# Patient Record
Sex: FEMALE | NOT HISPANIC OR LATINO | URBAN - METROPOLITAN AREA
[De-identification: names, ages, dates, MRNs, and addresses within clinical notes are randomized per-mention and may not be internally consistent; named-entity substitution may affect disease eponyms.]

---

## 2018-01-03 ENCOUNTER — APPOINTMENT (OUTPATIENT)
Dept: RADIOLOGY | Facility: MEDICAL CENTER | Age: 78
DRG: 329 | End: 2018-01-03
Payer: MEDICARE

## 2018-01-03 ENCOUNTER — HOSPITAL ENCOUNTER (INPATIENT)
Facility: MEDICAL CENTER | Age: 78
LOS: 4 days | DRG: 329 | End: 2018-01-07
Attending: EMERGENCY MEDICINE | Admitting: INTERNAL MEDICINE
Payer: MEDICARE

## 2018-01-03 ENCOUNTER — RESOLUTE PROFESSIONAL BILLING HOSPITAL PROF FEE (OUTPATIENT)
Dept: HOSPITALIST | Facility: MEDICAL CENTER | Age: 78
End: 2018-01-03
Payer: MEDICARE

## 2018-01-03 ENCOUNTER — APPOINTMENT (OUTPATIENT)
Dept: RADIOLOGY | Facility: MEDICAL CENTER | Age: 78
DRG: 329 | End: 2018-01-03
Attending: EMERGENCY MEDICINE
Payer: MEDICARE

## 2018-01-03 DIAGNOSIS — K56.2 CECAL VOLVULUS (HCC): ICD-10-CM

## 2018-01-03 DIAGNOSIS — J10.1 INFLUENZA A: ICD-10-CM

## 2018-01-03 LAB
ABO GROUP BLD: NORMAL
ABO GROUP BLD: NORMAL
ALBUMIN SERPL BCP-MCNC: 4.4 G/DL (ref 3.2–4.9)
ALBUMIN/GLOB SERPL: 1.6 G/DL
ALP SERPL-CCNC: 59 U/L (ref 30–99)
ALT SERPL-CCNC: 18 U/L (ref 2–50)
ANION GAP SERPL CALC-SCNC: 13 MMOL/L (ref 0–11.9)
APPEARANCE UR: ABNORMAL
APTT PPP: 23.1 SEC (ref 24.7–36)
AST SERPL-CCNC: 30 U/L (ref 12–45)
BACTERIA #/AREA URNS HPF: ABNORMAL /HPF
BASOPHILS # BLD AUTO: 0.2 % (ref 0–1.8)
BASOPHILS # BLD: 0.02 K/UL (ref 0–0.12)
BILIRUB SERPL-MCNC: 0.9 MG/DL (ref 0.1–1.5)
BILIRUB UR QL STRIP.AUTO: NEGATIVE
BLD GP AB SCN SERPL QL: NORMAL
BNP SERPL-MCNC: 671 PG/ML (ref 0–100)
BUN SERPL-MCNC: 16 MG/DL (ref 8–22)
CALCIUM SERPL-MCNC: 8.8 MG/DL (ref 8.5–10.5)
CEA SERPL-MCNC: 7.9 NG/ML (ref 0–3)
CHLORIDE SERPL-SCNC: 94 MMOL/L (ref 96–112)
CO2 SERPL-SCNC: 23 MMOL/L (ref 20–33)
COLOR UR: YELLOW
CREAT SERPL-MCNC: 0.62 MG/DL (ref 0.5–1.4)
EOSINOPHIL # BLD AUTO: 0 K/UL (ref 0–0.51)
EOSINOPHIL NFR BLD: 0 % (ref 0–6.9)
EPI CELLS #/AREA URNS HPF: ABNORMAL /HPF
ERYTHROCYTE [DISTWIDTH] IN BLOOD BY AUTOMATED COUNT: 40.4 FL (ref 35.9–50)
FLUAV RNA SPEC QL NAA+PROBE: POSITIVE
FLUBV RNA SPEC QL NAA+PROBE: NEGATIVE
GFR SERPL CREATININE-BSD FRML MDRD: >60 ML/MIN/1.73 M 2
GLOBULIN SER CALC-MCNC: 2.8 G/DL (ref 1.9–3.5)
GLUCOSE SERPL-MCNC: 193 MG/DL (ref 65–99)
GLUCOSE UR STRIP.AUTO-MCNC: 250 MG/DL
HCT VFR BLD AUTO: 43.6 % (ref 37–47)
HGB BLD-MCNC: 15.8 G/DL (ref 12–16)
HYALINE CASTS #/AREA URNS LPF: ABNORMAL /LPF
IMM GRANULOCYTES # BLD AUTO: 0.04 K/UL (ref 0–0.11)
IMM GRANULOCYTES NFR BLD AUTO: 0.4 % (ref 0–0.9)
INR PPP: 1.08 (ref 0.87–1.13)
KETONES UR STRIP.AUTO-MCNC: 15 MG/DL
LACTATE BLD-SCNC: 2.2 MMOL/L (ref 0.5–2)
LEUKOCYTE ESTERASE UR QL STRIP.AUTO: ABNORMAL
LIPASE SERPL-CCNC: 4 U/L (ref 11–82)
LYMPHOCYTES # BLD AUTO: 1.15 K/UL (ref 1–4.8)
LYMPHOCYTES NFR BLD: 10.9 % (ref 22–41)
MCH RBC QN AUTO: 33.1 PG (ref 27–33)
MCHC RBC AUTO-ENTMCNC: 36.2 G/DL (ref 33.6–35)
MCV RBC AUTO: 91.4 FL (ref 81.4–97.8)
MICRO URNS: ABNORMAL
MONOCYTES # BLD AUTO: 0.55 K/UL (ref 0–0.85)
MONOCYTES NFR BLD AUTO: 5.2 % (ref 0–13.4)
NEUTROPHILS # BLD AUTO: 8.79 K/UL (ref 2–7.15)
NEUTROPHILS NFR BLD: 83.3 % (ref 44–72)
NITRITE UR QL STRIP.AUTO: NEGATIVE
NRBC # BLD AUTO: 0 K/UL
NRBC BLD-RTO: 0 /100 WBC
PH UR STRIP.AUTO: 5.5 [PH]
PLATELET # BLD AUTO: 208 K/UL (ref 164–446)
PMV BLD AUTO: 9.1 FL (ref 9–12.9)
POTASSIUM SERPL-SCNC: 3.4 MMOL/L (ref 3.6–5.5)
PROT SERPL-MCNC: 7.2 G/DL (ref 6–8.2)
PROT UR QL STRIP: 100 MG/DL
PROTHROMBIN TIME: 13.7 SEC (ref 12–14.6)
RBC # BLD AUTO: 4.77 M/UL (ref 4.2–5.4)
RBC # URNS HPF: ABNORMAL /HPF
RBC UR QL AUTO: ABNORMAL
RH BLD: NORMAL
SODIUM SERPL-SCNC: 130 MMOL/L (ref 135–145)
SP GR UR STRIP.AUTO: 1.02
UROBILINOGEN UR STRIP.AUTO-MCNC: 0.2 MG/DL
WBC # BLD AUTO: 10.6 K/UL (ref 4.8–10.8)
WBC #/AREA URNS HPF: ABNORMAL /HPF

## 2018-01-03 PROCEDURE — 700117 HCHG RX CONTRAST REV CODE 255: Performed by: EMERGENCY MEDICINE

## 2018-01-03 PROCEDURE — 87502 INFLUENZA DNA AMP PROBE: CPT

## 2018-01-03 PROCEDURE — 160029 HCHG SURGERY MINUTES - 1ST 30 MINS LEVEL 4: Performed by: SURGERY

## 2018-01-03 PROCEDURE — 74177 CT ABD & PELVIS W/CONTRAST: CPT

## 2018-01-03 PROCEDURE — 96361 HYDRATE IV INFUSION ADD-ON: CPT

## 2018-01-03 PROCEDURE — 0DTF0ZZ RESECTION OF RIGHT LARGE INTESTINE, OPEN APPROACH: ICD-10-PCS | Performed by: SURGERY

## 2018-01-03 PROCEDURE — 85025 COMPLETE CBC W/AUTO DIFF WBC: CPT

## 2018-01-03 PROCEDURE — 80053 COMPREHEN METABOLIC PANEL: CPT

## 2018-01-03 PROCEDURE — 160002 HCHG RECOVERY MINUTES (STAT): Performed by: SURGERY

## 2018-01-03 PROCEDURE — 71045 X-RAY EXAM CHEST 1 VIEW: CPT

## 2018-01-03 PROCEDURE — 501433 HCHG STAPLER, GIA MULTIFIRE 60/80: Performed by: SURGERY

## 2018-01-03 PROCEDURE — 85610 PROTHROMBIN TIME: CPT

## 2018-01-03 PROCEDURE — 99291 CRITICAL CARE FIRST HOUR: CPT

## 2018-01-03 PROCEDURE — 85730 THROMBOPLASTIN TIME PARTIAL: CPT

## 2018-01-03 PROCEDURE — 86901 BLOOD TYPING SEROLOGIC RH(D): CPT

## 2018-01-03 PROCEDURE — 96374 THER/PROPH/DIAG INJ IV PUSH: CPT

## 2018-01-03 PROCEDURE — 160009 HCHG ANES TIME/MIN: Performed by: SURGERY

## 2018-01-03 PROCEDURE — 96375 TX/PRO/DX INJ NEW DRUG ADDON: CPT

## 2018-01-03 PROCEDURE — 86900 BLOOD TYPING SEROLOGIC ABO: CPT

## 2018-01-03 PROCEDURE — 160041 HCHG SURGERY MINUTES - EA ADDL 1 MIN LEVEL 4: Performed by: SURGERY

## 2018-01-03 PROCEDURE — 502704 HCHG DEVICE, LIGASURE IMPACT: Performed by: SURGERY

## 2018-01-03 PROCEDURE — 500122 HCHG BOVIE, BLADE: Performed by: SURGERY

## 2018-01-03 PROCEDURE — 81001 URINALYSIS AUTO W/SCOPE: CPT

## 2018-01-03 PROCEDURE — 501461: Performed by: SURGERY

## 2018-01-03 PROCEDURE — 86850 RBC ANTIBODY SCREEN: CPT

## 2018-01-03 PROCEDURE — 501838 HCHG SUTURE GENERAL: Performed by: SURGERY

## 2018-01-03 PROCEDURE — 83880 ASSAY OF NATRIURETIC PEPTIDE: CPT

## 2018-01-03 PROCEDURE — 160035 HCHG PACU - 1ST 60 MINS PHASE I: Performed by: SURGERY

## 2018-01-03 PROCEDURE — 83690 ASSAY OF LIPASE: CPT

## 2018-01-03 PROCEDURE — 501452 HCHG STAPLES, GIA MULTIFIRE 60/80: Performed by: SURGERY

## 2018-01-03 PROCEDURE — 36415 COLL VENOUS BLD VENIPUNCTURE: CPT

## 2018-01-03 PROCEDURE — 501435 HCHG STAPLER, LINEAR 60: Performed by: SURGERY

## 2018-01-03 PROCEDURE — C1765 ADHESION BARRIER: HCPCS | Performed by: SURGERY

## 2018-01-03 PROCEDURE — 700111 HCHG RX REV CODE 636 W/ 250 OVERRIDE (IP): Performed by: EMERGENCY MEDICINE

## 2018-01-03 PROCEDURE — 93005 ELECTROCARDIOGRAM TRACING: CPT | Performed by: EMERGENCY MEDICINE

## 2018-01-03 PROCEDURE — 700105 HCHG RX REV CODE 258: Performed by: EMERGENCY MEDICINE

## 2018-01-03 PROCEDURE — 770021 HCHG ROOM/CARE - ISO PRIVATE

## 2018-01-03 PROCEDURE — 87040 BLOOD CULTURE FOR BACTERIA: CPT

## 2018-01-03 PROCEDURE — 500424 HCHG DRESSING, AIRSTRIP: Performed by: SURGERY

## 2018-01-03 PROCEDURE — 501445 HCHG STAPLER, SKIN DISP: Performed by: SURGERY

## 2018-01-03 PROCEDURE — 160036 HCHG PACU - EA ADDL 30 MINS PHASE I: Performed by: SURGERY

## 2018-01-03 PROCEDURE — 88307 TISSUE EXAM BY PATHOLOGIST: CPT

## 2018-01-03 PROCEDURE — 82378 CARCINOEMBRYONIC ANTIGEN: CPT

## 2018-01-03 PROCEDURE — 700111 HCHG RX REV CODE 636 W/ 250 OVERRIDE (IP)

## 2018-01-03 PROCEDURE — 83605 ASSAY OF LACTIC ACID: CPT

## 2018-01-03 PROCEDURE — 700101 HCHG RX REV CODE 250

## 2018-01-03 PROCEDURE — A4314 CATH W/DRAINAGE 2-WAY LATEX: HCPCS | Performed by: SURGERY

## 2018-01-03 PROCEDURE — 160022 HCHG BLOCK: Performed by: SURGERY

## 2018-01-03 PROCEDURE — 160048 HCHG OR STATISTICAL LEVEL 1-5: Performed by: SURGERY

## 2018-01-03 RX ORDER — SODIUM CHLORIDE 9 MG/ML
1000 INJECTION, SOLUTION INTRAVENOUS ONCE
Status: COMPLETED | OUTPATIENT
Start: 2018-01-03 | End: 2018-01-03

## 2018-01-03 RX ORDER — BISACODYL 10 MG
10 SUPPOSITORY, RECTAL RECTAL
Status: DISCONTINUED | OUTPATIENT
Start: 2018-01-03 | End: 2018-01-07 | Stop reason: HOSPADM

## 2018-01-03 RX ORDER — POLYETHYLENE GLYCOL 3350 17 G/17G
1 POWDER, FOR SOLUTION ORAL
Status: DISCONTINUED | OUTPATIENT
Start: 2018-01-03 | End: 2018-01-07 | Stop reason: HOSPADM

## 2018-01-03 RX ORDER — AMOXICILLIN 250 MG
2 CAPSULE ORAL 2 TIMES DAILY
Status: DISCONTINUED | OUTPATIENT
Start: 2018-01-04 | End: 2018-01-07 | Stop reason: HOSPADM

## 2018-01-03 RX ORDER — MAGNESIUM HYDROXIDE 1200 MG/15ML
LIQUID ORAL
Status: DISCONTINUED | OUTPATIENT
Start: 2018-01-03 | End: 2018-01-03 | Stop reason: HOSPADM

## 2018-01-03 RX ORDER — ONDANSETRON 2 MG/ML
4 INJECTION INTRAMUSCULAR; INTRAVENOUS EVERY 4 HOURS PRN
Status: DISCONTINUED | OUTPATIENT
Start: 2018-01-03 | End: 2018-01-07 | Stop reason: HOSPADM

## 2018-01-03 RX ORDER — SODIUM CHLORIDE 9 MG/ML
INJECTION, SOLUTION INTRAVENOUS CONTINUOUS
Status: DISPENSED | OUTPATIENT
Start: 2018-01-03 | End: 2018-01-04

## 2018-01-03 RX ORDER — OSELTAMIVIR PHOSPHATE 75 MG/1
75 CAPSULE ORAL EVERY 12 HOURS
Status: DISCONTINUED | OUTPATIENT
Start: 2018-01-03 | End: 2018-01-07 | Stop reason: HOSPADM

## 2018-01-03 RX ORDER — ONDANSETRON 4 MG/1
4 TABLET, ORALLY DISINTEGRATING ORAL EVERY 4 HOURS PRN
Status: DISCONTINUED | OUTPATIENT
Start: 2018-01-03 | End: 2018-01-07 | Stop reason: HOSPADM

## 2018-01-03 RX ORDER — MORPHINE SULFATE 4 MG/ML
4 INJECTION, SOLUTION INTRAMUSCULAR; INTRAVENOUS ONCE
Status: COMPLETED | OUTPATIENT
Start: 2018-01-03 | End: 2018-01-03

## 2018-01-03 RX ORDER — MIDAZOLAM HYDROCHLORIDE 1 MG/ML
INJECTION INTRAMUSCULAR; INTRAVENOUS
Status: DISPENSED
Start: 2018-01-03 | End: 2018-01-04

## 2018-01-03 RX ORDER — MORPHINE SULFATE 4 MG/ML
1 INJECTION, SOLUTION INTRAMUSCULAR; INTRAVENOUS EVERY 4 HOURS PRN
Status: DISCONTINUED | OUTPATIENT
Start: 2018-01-03 | End: 2018-01-07 | Stop reason: HOSPADM

## 2018-01-03 RX ORDER — ONDANSETRON 2 MG/ML
4 INJECTION INTRAMUSCULAR; INTRAVENOUS ONCE
Status: COMPLETED | OUTPATIENT
Start: 2018-01-03 | End: 2018-01-03

## 2018-01-03 RX ADMIN — EPHEDRINE SULFATE 50 MG: 50 INJECTION INTRAMUSCULAR; INTRAVENOUS; SUBCUTANEOUS at 21:49

## 2018-01-03 RX ADMIN — IOHEXOL 100 ML: 350 INJECTION, SOLUTION INTRAVENOUS at 18:57

## 2018-01-03 RX ADMIN — ONDANSETRON 4 MG: 2 INJECTION INTRAMUSCULAR; INTRAVENOUS at 16:42

## 2018-01-03 RX ADMIN — SODIUM CHLORIDE 1000 ML: 9 INJECTION, SOLUTION INTRAVENOUS at 16:42

## 2018-01-03 RX ADMIN — MORPHINE SULFATE 4 MG: 4 INJECTION INTRAVENOUS at 17:33

## 2018-01-03 RX ADMIN — SODIUM CHLORIDE 1000 ML: 9 INJECTION, SOLUTION INTRAVENOUS at 17:33

## 2018-01-03 ASSESSMENT — PAIN SCALES - GENERAL
PAINLEVEL_OUTOF10: 0
PAINLEVEL_OUTOF10: 2
PAINLEVEL_OUTOF10: 0
PAINLEVEL_OUTOF10: 10

## 2018-01-03 ASSESSMENT — PATIENT HEALTH QUESTIONNAIRE - PHQ9
4. FEELING TIRED OR HAVING LITTLE ENERGY: NOT AT ALL
8. MOVING OR SPEAKING SO SLOWLY THAT OTHER PEOPLE COULD HAVE NOTICED. OR THE OPPOSITE, BEING SO FIGETY OR RESTLESS THAT YOU HAVE BEEN MOVING AROUND A LOT MORE THAN USUAL: NOT AT ALL
6. FEELING BAD ABOUT YOURSELF - OR THAT YOU ARE A FAILURE OR HAVE LET YOURSELF OR YOUR FAMILY DOWN: NOT AL ALL
9. THOUGHTS THAT YOU WOULD BE BETTER OFF DEAD, OR OF HURTING YOURSELF: SEVERAL DAYS
5. POOR APPETITE OR OVEREATING: NOT AT ALL
1. LITTLE INTEREST OR PLEASURE IN DOING THINGS: SEVERAL DAYS
3. TROUBLE FALLING OR STAYING ASLEEP OR SLEEPING TOO MUCH: NOT AT ALL
2. FEELING DOWN, DEPRESSED, IRRITABLE, OR HOPELESS: NOT AT ALL
7. TROUBLE CONCENTRATING ON THINGS, SUCH AS READING THE NEWSPAPER OR WATCHING TELEVISION: NOT AT ALL
SUM OF ALL RESPONSES TO PHQ QUESTIONS 1-9: 2
SUM OF ALL RESPONSES TO PHQ9 QUESTIONS 1 AND 2: 1

## 2018-01-03 ASSESSMENT — LIFESTYLE VARIABLES
ON A TYPICAL DAY WHEN YOU DRINK ALCOHOL HOW MANY DRINKS DO YOU HAVE: 2
ALCOHOL_USE: YES
EVER_SMOKED: YES
EVER HAD A DRINK FIRST THING IN THE MORNING TO STEADY YOUR NERVES TO GET RID OF A HANGOVER: NO
TOTAL SCORE: 0
TOTAL SCORE: 0
CONSUMPTION TOTAL: NEGATIVE
AVERAGE NUMBER OF DAYS PER WEEK YOU HAVE A DRINK CONTAINING ALCOHOL: 0
TOTAL SCORE: 0
HAVE YOU EVER FELT YOU SHOULD CUT DOWN ON YOUR DRINKING: NO
HAVE PEOPLE ANNOYED YOU BY CRITICIZING YOUR DRINKING: NO
HOW MANY TIMES IN THE PAST YEAR HAVE YOU HAD 5 OR MORE DRINKS IN A DAY: 0
EVER FELT BAD OR GUILTY ABOUT YOUR DRINKING: NO

## 2018-01-03 ASSESSMENT — COPD QUESTIONNAIRES
DURING THE PAST 4 WEEKS HOW MUCH DID YOU FEEL SHORT OF BREATH: NONE/LITTLE OF THE TIME
COPD SCREENING SCORE: 2
HAVE YOU SMOKED AT LEAST 100 CIGARETTES IN YOUR ENTIRE LIFE: NO/DON'T KNOW
DO YOU EVER COUGH UP ANY MUCUS OR PHLEGM?: NO/ONLY WITH OCCASIONAL COLDS OR INFECTIONS

## 2018-01-03 NOTE — ED NOTES
Blood specimen collected per ER protocol. Pt educated on clean catch urine sample and specimen cup provided. PT to senior waiting area.

## 2018-01-03 NOTE — ED NOTES
".  Chief Complaint   Patient presents with   • Flu Like Symptoms     N/V/D x 1 week, unable to tolerate PO   • Abdominal Pain     x 1 week   • Shortness of Breath     Speaking in full sentences, NAD     ./64   Pulse 94   Temp 37.3 °C (99.1 °F)   Resp 16   Ht 1.549 m (5' 1\")   Wt 61.6 kg (135 lb 12.9 oz)   SpO2 92%   BMI 25.66 kg/m²     Ambulatory to triage with above complaint, given mask, educated on triage process, placed in lobby with family member, told to inform staff of any changes in condition.    "

## 2018-01-04 PROBLEM — E87.20 LACTIC ACIDOSIS: Status: ACTIVE | Noted: 2018-01-04

## 2018-01-04 PROBLEM — E87.6 HYPOKALEMIA: Status: ACTIVE | Noted: 2018-01-04

## 2018-01-04 PROBLEM — J10.1 INFLUENZA A: Status: ACTIVE | Noted: 2018-01-04

## 2018-01-04 PROBLEM — E87.1 HYPONATREMIA: Status: ACTIVE | Noted: 2018-01-04

## 2018-01-04 PROCEDURE — 90670 PCV13 VACCINE IM: CPT | Performed by: SURGERY

## 2018-01-04 PROCEDURE — G8980 MOBILITY D/C STATUS: HCPCS | Mod: CH

## 2018-01-04 PROCEDURE — A9270 NON-COVERED ITEM OR SERVICE: HCPCS | Performed by: INTERNAL MEDICINE

## 2018-01-04 PROCEDURE — 90471 IMMUNIZATION ADMIN: CPT

## 2018-01-04 PROCEDURE — 97161 PT EVAL LOW COMPLEX 20 MIN: CPT

## 2018-01-04 PROCEDURE — 97165 OT EVAL LOW COMPLEX 30 MIN: CPT

## 2018-01-04 PROCEDURE — G8987 SELF CARE CURRENT STATUS: HCPCS | Mod: CI

## 2018-01-04 PROCEDURE — 770021 HCHG ROOM/CARE - ISO PRIVATE

## 2018-01-04 PROCEDURE — G8979 MOBILITY GOAL STATUS: HCPCS | Mod: CH

## 2018-01-04 PROCEDURE — 94760 N-INVAS EAR/PLS OXIMETRY 1: CPT

## 2018-01-04 PROCEDURE — 3E0234Z INTRODUCTION OF SERUM, TOXOID AND VACCINE INTO MUSCLE, PERCUTANEOUS APPROACH: ICD-10-PCS | Performed by: SURGERY

## 2018-01-04 PROCEDURE — 99407 BEHAV CHNG SMOKING > 10 MIN: CPT | Performed by: INTERNAL MEDICINE

## 2018-01-04 PROCEDURE — A9270 NON-COVERED ITEM OR SERVICE: HCPCS | Performed by: SURGERY

## 2018-01-04 PROCEDURE — 99223 1ST HOSP IP/OBS HIGH 75: CPT | Mod: 25 | Performed by: INTERNAL MEDICINE

## 2018-01-04 PROCEDURE — 700102 HCHG RX REV CODE 250 W/ 637 OVERRIDE(OP): Performed by: SURGERY

## 2018-01-04 PROCEDURE — G8988 SELF CARE GOAL STATUS: HCPCS | Mod: CI

## 2018-01-04 PROCEDURE — 700105 HCHG RX REV CODE 258: Performed by: INTERNAL MEDICINE

## 2018-01-04 PROCEDURE — G8978 MOBILITY CURRENT STATUS: HCPCS | Mod: CH

## 2018-01-04 PROCEDURE — 700111 HCHG RX REV CODE 636 W/ 250 OVERRIDE (IP): Performed by: SURGERY

## 2018-01-04 PROCEDURE — 700102 HCHG RX REV CODE 250 W/ 637 OVERRIDE(OP): Performed by: INTERNAL MEDICINE

## 2018-01-04 PROCEDURE — G8989 SELF CARE D/C STATUS: HCPCS | Mod: CI

## 2018-01-04 RX ORDER — HYDROCODONE BITARTRATE AND ACETAMINOPHEN 5; 325 MG/1; MG/1
1-2 TABLET ORAL EVERY 6 HOURS PRN
Qty: 20 TAB | Refills: 0 | Status: SHIPPED | OUTPATIENT
Start: 2018-01-04 | End: 2018-01-07

## 2018-01-04 RX ORDER — HYDROCODONE BITARTRATE AND ACETAMINOPHEN 5; 325 MG/1; MG/1
1-2 TABLET ORAL EVERY 6 HOURS PRN
Status: DISCONTINUED | OUTPATIENT
Start: 2018-01-04 | End: 2018-01-07 | Stop reason: HOSPADM

## 2018-01-04 RX ADMIN — OSELTAMIVIR PHOSPHATE 75 MG: 75 CAPSULE ORAL at 20:07

## 2018-01-04 RX ADMIN — OSELTAMIVIR PHOSPHATE 75 MG: 75 CAPSULE ORAL at 08:10

## 2018-01-04 RX ADMIN — STANDARDIZED SENNA CONCENTRATE AND DOCUSATE SODIUM 2 TABLET: 8.6; 5 TABLET, FILM COATED ORAL at 20:07

## 2018-01-04 RX ADMIN — HYDROCODONE BITARTRATE AND ACETAMINOPHEN 1 TABLET: 5; 325 TABLET ORAL at 08:10

## 2018-01-04 RX ADMIN — SODIUM CHLORIDE: 9 INJECTION, SOLUTION INTRAVENOUS at 00:48

## 2018-01-04 RX ADMIN — ENOXAPARIN SODIUM 40 MG: 100 INJECTION SUBCUTANEOUS at 08:10

## 2018-01-04 RX ADMIN — STANDARDIZED SENNA CONCENTRATE AND DOCUSATE SODIUM 2 TABLET: 8.6; 5 TABLET, FILM COATED ORAL at 08:10

## 2018-01-04 RX ADMIN — PNEUMOCOCCAL 13-VALENT CONJUGATE VACCINE 0.5 ML: 2.2; 2.2; 2.2; 2.2; 2.2; 4.4; 2.2; 2.2; 2.2; 2.2; 2.2; 2.2; 2.2 INJECTION, SUSPENSION INTRAMUSCULAR at 01:27

## 2018-01-04 RX ADMIN — SODIUM CHLORIDE: 9 INJECTION, SOLUTION INTRAVENOUS at 10:57

## 2018-01-04 RX ADMIN — HYDROCODONE BITARTRATE AND ACETAMINOPHEN 1 TABLET: 5; 325 TABLET ORAL at 14:46

## 2018-01-04 RX ADMIN — OSELTAMIVIR PHOSPHATE 75 MG: 75 CAPSULE ORAL at 00:47

## 2018-01-04 RX ADMIN — HYDROCODONE BITARTRATE AND ACETAMINOPHEN 1 TABLET: 5; 325 TABLET ORAL at 20:07

## 2018-01-04 ASSESSMENT — ENCOUNTER SYMPTOMS
WHEEZING: 0
NAUSEA: 1
CHILLS: 1
BLOOD IN STOOL: 0
SPUTUM PRODUCTION: 0
COUGH: 1
CHILLS: 0
PALPITATIONS: 0
HEMOPTYSIS: 0
MYALGIAS: 0
VOMITING: 1
HEARTBURN: 0
BLOOD IN STOOL: 1
SHORTNESS OF BREATH: 1
WEAKNESS: 1
LOSS OF CONSCIOUSNESS: 0
BLURRED VISION: 0
NECK PAIN: 0
EYE PAIN: 0
NERVOUS/ANXIOUS: 0
CONSTIPATION: 0
DIARRHEA: 1
COUGH: 0
SEIZURES: 0
TREMORS: 0
INSOMNIA: 0
FEVER: 0
DEPRESSION: 0
HEADACHES: 0
DIZZINESS: 0
WEIGHT LOSS: 0
ABDOMINAL PAIN: 1
EYE REDNESS: 0
DIARRHEA: 0
FALLS: 0
FOCAL WEAKNESS: 0

## 2018-01-04 ASSESSMENT — PAIN SCALES - GENERAL
PAINLEVEL_OUTOF10: 2
PAINLEVEL_OUTOF10: 5
PAINLEVEL_OUTOF10: 2
PAINLEVEL_OUTOF10: 4
PAINLEVEL_OUTOF10: 2
PAINLEVEL_OUTOF10: 5
PAINLEVEL_OUTOF10: 2

## 2018-01-04 ASSESSMENT — COGNITIVE AND FUNCTIONAL STATUS - GENERAL
DRESSING REGULAR LOWER BODY CLOTHING: A LITTLE
DAILY ACTIVITIY SCORE: 21
HELP NEEDED FOR BATHING: A LITTLE
SUGGESTED CMS G CODE MODIFIER MOBILITY: CH
TOILETING: A LITTLE
SUGGESTED CMS G CODE MODIFIER DAILY ACTIVITY: CJ
MOBILITY SCORE: 24

## 2018-01-04 ASSESSMENT — GAIT ASSESSMENTS
GAIT LEVEL OF ASSIST: SUPERVISED
DISTANCE (FEET): 600

## 2018-01-04 ASSESSMENT — ACTIVITIES OF DAILY LIVING (ADL): TOILETING: INDEPENDENT

## 2018-01-04 ASSESSMENT — LIFESTYLE VARIABLES: EVER_SMOKED: YES

## 2018-01-04 NOTE — CARE PLAN
Problem: Safety  Goal: Will remain free from falls  Outcome: PROGRESSING AS EXPECTED  Encouraged to call for assistance. Pt calls appropriately.     Problem: Venous Thromboembolism (VTW)/Deep Vein Thrombosis (DVT) Prevention:  Goal: Patient will participate in Venous Thrombosis (VTE)/Deep Vein Thrombosis (DVT)Prevention Measures  Outcome: PROGRESSING AS EXPECTED  SCD's in place, lovenox started today    Problem: Bowel/Gastric:  Goal: Normal bowel function is maintained or improved  Outcome: PROGRESSING AS EXPECTED  -flatus, -BM, hypoactive bowel sounds in upper quadrants. Denies n/v, tolerating clears.     Problem: Pain Management  Goal: Pain level will decrease to patient's comfort goal  Outcome: PROGRESSING AS EXPECTED  Well controlled with PRN medications

## 2018-01-04 NOTE — FACE TO FACE
Face to Face Supporting Documentation - Home Health    The encounter with this patient was in whole or in part the primary reason for home health admission.    Date of encounter:   Patient:                    MRN:                       YOB: 2016  Lyric Belle  [id number]  1940     Home health to see patient for:  Skilled Nursing care for assessment, interventions & education  OT PT  Skilled need for:  Surgical Aftercare ceca;l volvulus surgery    Skilled nursing interventions to include:  Wound Care    Homebound status evidenced by:  Needs the assistance of another person in order to leave the home. Leaving home requires a considerable and taxing effort. There is a normal inability to leave the home.    Community Physician to provide follow up care: Stone Kelly M.D.     Optional Interventions? No      I certify the face to face encounter for this home health care referral meets the CMS requirements and the encounter/clinical assessment with the patient was, in whole, or in part, for the medical condition(s) listed above, which is the primary reason for home health care. Based on my clinical findings: the service(s) are medically necessary, support the need for home health care, and the homebound criteria are met.  I certify that this patient has had a face to face encounter by myself.  Joes Ly M.D. - NPI: [id number]

## 2018-01-04 NOTE — THERAPY
"Physical Therapy Evaluation completed.   Bed Mobility:  Supine to Sit: Supervised  Transfers: Sit to Stand: Supervised  Gait: Level Of Assist: Supervised with No Equipment Needed       Plan of Care: Patient with no further skilled PT needs in the acute care setting at this time  Discharge Recommendations: Equipment: No Equipment Needed.       See \"Rehab Therapy-Acute\" Patient Summary Report for complete documentation.     "

## 2018-01-04 NOTE — THERAPY
"Occupational Therapy Evaluation completed.   Functional Status:  Pt is a 78 y/o female admitted for cecal volvulus, now s/p ex lap and hemicolectomy. Pt initially a bit unsteady due to pain upon standing but as session progressed she was ambulating to/from the bathroom with supervision and no AD. Pt able to complete toileting, toilet transfer, grooming, lower body dressing, and donning abdominal binder with supervision and cues for compensatory strategies to prevent abdominal pain. Pt educated on home safety as well as appropriate DME for home. Pt denies any further deficits in ADLs at this time and will have good support from her family for IADLs. Recc pt continue to ambulate with nursing as much as tolerated to maintain strength and endurance.  Plan of Care: Patient with no further skilled OT needs in the acute care setting at this time  Discharge Recommendations:  Equipment: Shower Chair. Post-acute therapy Discharge to home with outpatient or home health for additional skilled therapy services.    See \"Rehab Therapy-Acute\" Patient Summary Report for complete documentation.    "

## 2018-01-04 NOTE — PROGRESS NOTES
"Surgical Progress Note:    POD# 1  S/P right hemicolectomy      No acute overnight events. Has abdominal \"soreness\". Tolerating clears.    PE:  /62   Pulse 87   Temp 36.9 °C (98.4 °F)   Resp 17   Ht 1.549 m (5' 1\")   Wt 61.6 kg (135 lb 12.9 oz)   SpO2 94%   Breastfeeding? No   BMI 25.66 kg/m²     I/O:   Intake/Output Summary (Last 24 hours) at 01/04/18 0648  Last data filed at 01/03/18 2300   Gross per 24 hour   Intake             1100 ml   Output              350 ml   Net              750 ml     UOP: 250  PO: 100  IV: 1000    NAD  Abdomen soft, distended, dressing dry    A/P: POD# 1  S/P right hemicolectomy    - OOB and ambulate, IS  - start lovenox for DVT prophylaxis  - abdominal binder  - await return of bowel function  - pt at increased risk of PNA given surgery + influenza - will monitor closely. CXR yesterday clear.     Leslye Martin M.D.  [facility]  [phone]    "

## 2018-01-04 NOTE — ED PROVIDER NOTES
ED Provider Note    Scribed for Sona Mendes M.D. by George Jalloh. 1/3/2018, 4:15 PM.    Primary care provider: Stone Kelly M.D.  Means of arrival: Walk-in  History obtained from: Patient  History limited by: None    CHIEF COMPLAINT  Chief Complaint   Patient presents with   • Flu Like Symptoms     N/V/D x 1 week, unable to tolerate PO   • Abdominal Pain     x 1 week       HPI  Lyric Belle is a 77 y.o. female who presents to the Emergency Department complaining of a cough onset 1 week ago. The patient's symptoms began as a cough and sneezing, but when she woke up the next morning she said she felt extremely ill. Her cough and sneezing then mildly resolved 4 days ago before returning the following day with additional symptoms of nausea, vomiting, diarrhea, loss of appetite, and back and upper abdominal pain, both worse on her right side. She has been unable to tolerate any food or liquid PO and denies any episodes of diarrhea today.The pain is in her right side radiates to her back. She has had associated intermittent fevers but is unclear if this is related to her flulike symptoms. It is a constant pain.  She confirms a history of appendectomy and denies cholecystectomy or other previous health conditions.    REVIEW OF SYSTEMS  Pertinent positives include cough, nausea, vomiting, sneezing, back pain, abdominal pain. Pertinent negatives include diarrhea today.  All other systems reviewed and negative. C.    PAST MEDICAL HISTORY   Patient does not have any pertinent past medical history.    SURGICAL HISTORY  Appendectomy    SOCIAL HISTORY  Patient presented with her daughter-in-law    FAMILY HISTORY  No pertinent family history.    CURRENT MEDICATIONS  Patient does not take any current medications.    ALLERGIES  Allergies   Allergen Reactions   • Erythromycin    • Flagyl [Metronidazole]    • Pcn [Penicillins]    • Sulfa Drugs        PHYSICAL EXAM  VITAL SIGNS: /99   Pulse 92   Temp 36.9 °C  "(98.5 °F)   Resp 16   Ht 1.549 m (5' 1\")   Wt 61.6 kg (135 lb 12.9 oz)   SpO2 93%   BMI 25.66 kg/m²   Constitutional: Alert in no apparent distress.  HENT: No signs of trauma, Bilateral external ears normal, Nose normal. Dry mucous membranes.   Eyes: Pupils are equal and reactive, Conjunctiva normal, Non-icteric.   Neck: Normal range of motion, No tenderness, Supple, No stridor.   Cardiovascular: Tachycardic, no murmurs.   Thorax & Lungs: Normal breath sounds, Lungs clear, No respiratory distress, No wheezing, No chest tenderness.   Abdomen: Bowel sounds normal, Soft, No masses, No peritoneal signs. Mild diffuse tenderness, no Gomez's sign tenderness  Skin: Warm, Dry, No erythema, No rash.   Musculoskeletal:  No major deformities noted.  Neurologic: Alert, moving all extremities without difficulty, no focal deficits.    LABS  Labs Reviewed   BTYPE NATRIURETIC PEPTIDE - Abnormal; Notable for the following:        Result Value    B Natriuretic Peptide 671 (*)     All other components within normal limits   CBC WITH DIFFERENTIAL - Abnormal; Notable for the following:     MCH 33.1 (*)     MCHC 36.2 (*)     Neutrophils-Polys 83.30 (*)     Lymphocytes 10.90 (*)     Neutrophils (Absolute) 8.79 (*)     All other components within normal limits   COMP METABOLIC PANEL - Abnormal; Notable for the following:     Sodium 130 (*)     Potassium 3.4 (*)     Chloride 94 (*)     Anion Gap 13.0 (*)     Glucose 193 (*)     All other components within normal limits   LIPASE - Abnormal; Notable for the following:     Lipase 4 (*)     All other components within normal limits   URINALYSIS - Abnormal; Notable for the following:     Character Cloudy (*)     Glucose 250 (*)     Ketones 15 (*)     Protein 100 (*)     Leukocyte Esterase Trace (*)     Occult Blood Moderate (*)     All other components within normal limits   LACTIC ACID - Abnormal; Notable for the following:     Lactic Acid 2.2 (*)     All other components within normal " "limits   INFLUENZA A/B BY PCR - Abnormal; Notable for the following:     Influenza virus A RNA POSITIVE (*)     All other components within normal limits   URINE MICROSCOPIC (W/UA) - Abnormal; Notable for the following:     WBC 5-10 (*)     RBC 20-50 (*)     Bacteria Moderate (*)     Epithelial Cells Many (*)     Hyaline Cast 3-5 (*)     All other components within normal limits   BLOOD CULTURE    Narrative:     Per Hospital Policy: Only change Specimen Src: to \"Line\" if  specified by physician order.   BLOOD CULTURE    Narrative:     Per Hospital Policy: Only change Specimen Src: to \"Line\" if  specified by physician order.   ESTIMATED GFR   COD (ADULT)   PROTHROMBIN TIME   APTT     All labs reviewed by me.    RADIOLOGY  CT-ABDOMEN-PELVIS WITH   Final Result      1.  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      2.  Diverticulosis without evidence of diverticulitis.      3.  Cholelithiasis.      This was discussed with Dr. Sona Mendes at 7:05 PM.      DX-CHEST-LIMITED (1 VIEW)   Final Result         No acute cardiopulmonary abnormalities are identified.        The radiologist's interpretation of all radiological studies have been reviewed by me.    EKG  12 Lead EKG interpreted by me to show:  Normal sinus rhythm  Rate 89  Axis: Normal  Intervals: Normal  Q waves in lead 3  Inverted T waves in lead 3 no other T-wave abnormalities  Normal ST segments  My impression of this EKG: Nonspecific T-wave inversions without any evidence of acute ischemia    COURSE & MEDICAL DECISION MAKING  Pertinent Labs & Imaging studies reviewed. (See chart for details)    Differential diagnoses include but are not limited to: dehydration, influenza, pneumonia    4:15 PM - Patient seen and examined at bedside. Informed the patient that labs and imaging would be conducted to assess her symptoms. Patient will be treated with Zofran. Ordered DX chest, influenza, lactic acid, eGFR, BNP, blood culture, CBC, CMP, " lipase, and urinalysis to evaluate. NS bolus administered for tachycardia, lack of PO fluids, and presumed dehydration    5:27 PM Patient will be treated with Morphine 4 mg IV for continuing abdominal pain and an additional NS bolus for dehydration and persistent nausea.    7:14pm I spoke with Dr. Martin on-call for general surgery who will take the patient to the operating room.    Decision Making:  This is a 77 y.o. year old female who presents with Flulike symptoms and right-sided abdominal pain. She was Flu A positive. She did show some signs of dehydration with an elevated anion gap. She is given IV fluids for this. She had a normal white blood cell count with a slight left shift. Lipase negative. Given her persistent abdominal pain CT scan was performed and she did appear to have a cecal volvulus. I did speak with general surgery regarding this to take her to the operating room tonight. She'll be admitted to the medicine service otherwise. I spoke with Dr. Ferro who will admit the patient.    Patient will be admitted to the medicine service with surgery consult in guarded condition.    FINAL IMPRESSION  1. Cecal volvulus (CMS-HCC)    2. Influenza A             This dictation has been created using voice recognition software and/or scribes. The accuracy of the dictation is limited by the abilities of the software and the expertise of the scribes. I expect there may be some errors of grammar and possibly content. I made every attempt to manually correct the errors within my dictation. However, errors related to voice recognition software and/or scribes may still exist and should be interpreted within the appropriate context.     I, George Jalloh (Scribe), am scribing for, and in the presence of, Sona Mendes M.D..    Electronically signed by: George Jalloh (Scribe), 1/3/2018    ISona M.D. personally performed the services described in this documentation, as scribed by George Jalloh in my  presence, and it is both accurate and complete.    The note accurately reflects work and decisions made by me.  Sona Mendes  1/3/2018  7:22 PM

## 2018-01-04 NOTE — ASSESSMENT & PLAN NOTE
-No evidence of sepsis likely secondary to bowel wall edema and necrosis  -Status post surgery as outlined above and continue IV fluid resuscitation  Only mild elevation  OK for discharge.

## 2018-01-04 NOTE — CARE PLAN
Problem: Safety  Goal: Will remain free from injury  Outcome: PROGRESSING AS EXPECTED  Bed locked and in lowest position. Call light within reach. Patient educated to use call light if assistance is needed from nursing staff. Patient verbalizes understanding.     Problem: Venous Thromboembolism (VTW)/Deep Vein Thrombosis (DVT) Prevention:  Goal: Patient will participate in Venous Thrombosis (VTE)/Deep Vein Thrombosis (DVT)Prevention Measures  Outcome: PROGRESSING AS EXPECTED  SCDs on

## 2018-01-04 NOTE — ASSESSMENT & PLAN NOTE
-Secondary to hypovolemia and dehydration continue IV fluids and trend sodium levels  Ordered labs  Resolved.

## 2018-01-04 NOTE — ASSESSMENT & PLAN NOTE
Flu Like Symptoms (N/V/D x 1 week, unable to tolerate PO); Abdominal Pain (x 1 week); and Shortness of Breath (Speaking in full sentences, NAD)  Was found to have confirmed on CT scan with dead cecum, underwent right-sided hemicolectomy with anastomosis which she tolerated the procedure well on 1/3 by Dr. Martin  Once she passes gas, diet to be advanced to clears  PT/OT ordered.   HHC ordered  At discharge date she tolerated regular diet and has bowel movement. She walked with PT. Dr. Germain cleared her for discharge. She wants to go home with home health care for dressing changes and help with wound care; she did say she can have family help her as well. SW to arrange that.  Follow up with Stone Kelly M.D. In 1-2 weeks  Follow up with Dr. Germain, in 1 week for postop visit

## 2018-01-04 NOTE — PROGRESS NOTES
Pt A&Ox4, sitting up in bed. C/o 4/10 abdominal pain and describes as soreness, medicated per MAR. Saturating >90% on 1L NC at this time. Pt on droplet precautions for influenza A. Pt has productive cough, lung sounds clear and expiratory wheeze in bases. Encouraged deep breathing and coughing. IS at bedside. Midline incision to abdomen with island dressing, old drainage present. Abdominal binder placed. Pt tolerating clear liquid diet with no n/v, hypoactive bowel sounds present. -flatus, -BM. Safety education provided. Bed locked in low. Call light within reach. Pt call appropriately for assistance. Ambulates SBA. Hourly rounding in place. All needs met at this time.

## 2018-01-04 NOTE — ASSESSMENT & PLAN NOTE
-Replace orally and check BMP in the morning  Ordered labs.  Mildly low, should improve as her diet now advanced to regular.

## 2018-01-04 NOTE — PROGRESS NOTES
Patient arrived to [place] [id number] via [facility] with transport. Patient ambulated from [facility] to bed, steady gait with initial dizziness.   Droplet precautions in place.  Patient is AOx4  Patient came up on 2 L of O2, was able to wean down to 1 L saturating at 95%.  Patient denies pain at this time.  Midline abdominal incision covered with island dressing.  Admit partially complete, awaiting Prevnar vaccine to arrive to floor.     Oriented to room call light and smoking policy.  Reviewed plan of care (equipment, incentive spirometer, sequential compression devices, medications, activity, diet, fall precautions, skin care, and pain) with patient.    2 RN skin check complete. No breakdown at bony prominences.

## 2018-01-04 NOTE — H&P
HOSPITAL MEDICINE HISTORY/ PHYSICAL    Date of Service:  1/4/2018   1:30 AM       Patient ID:   Name: Lyric Belle. YOB: 1940. Age: 77 y.o. female. MRN: [id number]    Admitting Attending:  Finn Velez     PCP : Stone Kelly M.D.          Chief Complaint:       Abdominal pain with nausea vomiting diarrhea for 1 week    History of Present Illness:    Yoshi is a 77 y.o. female w/h/o an attic and past medical problems who presents with above chief complaint. Patient states that for the last week she's had generalized symptoms including nausea vomiting diarrhea. She's been taking ibuprofen as well as vitamin C with minimal reduction in symptoms. Over the same time. She's also developed right lower quadrant abdominal pain that is nonradiating but describes horrible pain and she never really find a comfortable position. He is able to pass stool with some mild blood in it but is able to keep her weight stable. She's never had belly pain like this before and denies any recent trauma. She did have a colonoscopy around the age of 67 and had 2-3 polyps which were benign in nature. She currently says her abdominal pain is a 5 out of 10 and she overall feels generally unwell and there is multiple sick contacts at home as well. She has a history of one prior abdominal surgery many many years ago as well as right breast surgery for breast cancer.     Review of Systems:    Has Review of Systems   Constitutional: Positive for chills and malaise/fatigue. Negative for fever and weight loss.   HENT: Negative for hearing loss.    Eyes: Negative for blurred vision.   Respiratory: Positive for cough and shortness of breath. Negative for hemoptysis and sputum production.    Cardiovascular: Negative for chest pain, palpitations and leg swelling.   Gastrointestinal: Positive for abdominal pain, blood in stool, diarrhea, nausea and vomiting. Negative for heartburn.   Genitourinary: Negative for dysuria and urgency.  "  Musculoskeletal: Negative for myalgias and neck pain.   Skin: Negative for itching.   Neurological: Positive for weakness. Negative for dizziness, focal weakness and loss of consciousness.   Psychiatric/Behavioral: Negative for depression.   All other systems reviewed and are negative.    Please see HPI, all other systems were reviewed and are negative (AMA/CMS criteria)              Past Medical/ Family / Social history (PFSH):   Past medical history  -Hypertension    Surgical history includes right sided mastectomy as well as tubal ligation    Current Outpatient Medications:  No current facility-administered medications on file prior to encounter.      No current outpatient prescriptions on file prior to encounter.       Medication Allergy/Sensitivities:  Allergies   Allergen Reactions   • Erythromycin    • Flagyl [Metronidazole]    • Pcn [Penicillins]    • Sulfa Drugs        Family History:  Denies any family history of abdominal issues or hypertension     Social History:  Social no drugs, continues to smoke half a pack per day.  Counseled the patient on the importance of tobacco cessation including the use of chantix, wellbutrin, and hypnosis. Patient is in understanding of this issue.  Greater than 10 minutes spent on this counseling. She does not express any desire in quitting tobacco at this time.    BILL CODE 93102.    #################################################################  Physical Exam:   Vitals/ General Appearance:   Weight/BMI: Body mass index is 25.66 kg/m².  Blood pressure 128/62, pulse 92, temperature 37.1 °C (98.7 °F), resp. rate 18, height 1.549 m (5' 1\"), weight 61.6 kg (135 lb 12.9 oz), SpO2 94 %, not currently breastfeeding.   Vitals:    01/03/18 2230 01/03/18 2245 01/03/18 2300 01/03/18 2315   BP: 111/60 115/61 124/64 128/62   Pulse:       Resp:       Temp:    37.1 °C (98.7 °F)   SpO2: 96% 94% 94% 94%   Weight:       Height:        Oxygen Therapy:  Pulse Oximetry: 94 %, O2 (LPM): 2, " O2 Delivery: Nasal Cannula    Constitutional:  Appears somewhat ill but is conversant  HENMT: Normocephalic, atraumatic, b/l ears normal, nose normal  Eyes:  EOMI, conjunctiva normal, no discharge  Neck: no tracheal deviation, supple  Cardiovascular: normal heart rate, normal rhythm, no murmurs, no rubs or gallops; no cyanosis, clubbing or edema  Lungs: Respiratory effort is normal, normal breath sounds, breath sounds clear to auscultation b/l, some rales at the bases, rhonchi or wheezing  Abdomen: soft, tenderness to palpation right lower quadrant moderate intensity, active bowel sounds, no rebound tenderness or guarding, no paraspinal megaly appreciated  Skin: warm, dry, no erythema, no rash  Neurologic: Alert and oriented, strength 5 out of 5 throughout, no focal deficits, CN II-XII normal  Psychiatric: No anxiety or depression    #################################################################  Lab Data Review:    Objective   Recent Results (from the past 24 hour(s))   BTYPE NATRIURETIC PEPTIDE    Collection Time: 01/03/18  2:06 PM   Result Value Ref Range    B Natriuretic Peptide 671 (H) 0 - 100 pg/mL   CBC WITH DIFFERENTIAL    Collection Time: 01/03/18  2:06 PM   Result Value Ref Range    WBC 10.6 4.8 - 10.8 K/uL    RBC 4.77 4.20 - 5.40 M/uL    Hemoglobin 15.8 12.0 - 16.0 g/dL    Hematocrit 43.6 37.0 - 47.0 %    MCV 91.4 81.4 - 97.8 fL    MCH 33.1 (H) 27.0 - 33.0 pg    MCHC 36.2 (H) 33.6 - 35.0 g/dL    RDW 40.4 35.9 - 50.0 fL    Platelet Count 208 164 - 446 K/uL    MPV 9.1 9.0 - 12.9 fL    Neutrophils-Polys 83.30 (H) 44.00 - 72.00 %    Lymphocytes 10.90 (L) 22.00 - 41.00 %    Monocytes 5.20 0.00 - 13.40 %    Eosinophils 0.00 0.00 - 6.90 %    Basophils 0.20 0.00 - 1.80 %    Immature Granulocytes 0.40 0.00 - 0.90 %    Nucleated RBC 0.00 /100 WBC    Neutrophils (Absolute) 8.79 (H) 2.00 - 7.15 K/uL    Lymphs (Absolute) 1.15 1.00 - 4.80 K/uL    Monos (Absolute) 0.55 0.00 - 0.85 K/uL    Eos (Absolute) 0.00 0.00 -  0.51 K/uL    Baso (Absolute) 0.02 0.00 - 0.12 K/uL    Immature Granulocytes (abs) 0.04 0.00 - 0.11 K/uL    NRBC (Absolute) 0.00 K/uL   COMP METABOLIC PANEL    Collection Time: 01/03/18  2:06 PM   Result Value Ref Range    Sodium 130 (L) 135 - 145 mmol/L    Potassium 3.4 (L) 3.6 - 5.5 mmol/L    Chloride 94 (L) 96 - 112 mmol/L    Co2 23 20 - 33 mmol/L    Anion Gap 13.0 (H) 0.0 - 11.9    Glucose 193 (H) 65 - 99 mg/dL    Bun 16 8 - 22 mg/dL    Creatinine 0.62 0.50 - 1.40 mg/dL    Calcium 8.8 8.5 - 10.5 mg/dL    AST(SGOT) 30 12 - 45 U/L    ALT(SGPT) 18 2 - 50 U/L    Alkaline Phosphatase 59 30 - 99 U/L    Total Bilirubin 0.9 0.1 - 1.5 mg/dL    Albumin 4.4 3.2 - 4.9 g/dL    Total Protein 7.2 6.0 - 8.2 g/dL    Globulin 2.8 1.9 - 3.5 g/dL    A-G Ratio 1.6 g/dL   LIPASE    Collection Time: 01/03/18  2:06 PM   Result Value Ref Range    Lipase 4 (L) 11 - 82 U/L   LACTIC ACID    Collection Time: 01/03/18  2:06 PM   Result Value Ref Range    Lactic Acid 2.2 (H) 0.5 - 2.0 mmol/L   ESTIMATED GFR    Collection Time: 01/03/18  2:06 PM   Result Value Ref Range    GFR If African American >60 >60 mL/min/1.73 m 2    GFR If Non African American >60 >60 mL/min/1.73 m 2   COD (ADULT)    Collection Time: 01/03/18  2:06 PM   Result Value Ref Range    ABO Grouping Only O     Rh Grouping Only POS     Antibody Screen-Cod NEG    CEA    Collection Time: 01/03/18  2:06 PM   Result Value Ref Range    Carcinoembryonic Antigen 7.9 (H) 0.0 - 3.0 ng/mL   URINALYSIS    Collection Time: 01/03/18  4:18 PM   Result Value Ref Range    Color Yellow     Character Cloudy (A)     Specific Gravity 1.024 <1.035    Ph 5.5 5.0 - 8.0    Glucose 250 (A) Negative mg/dL    Ketones 15 (A) Negative mg/dL    Protein 100 (A) Negative mg/dL    Bilirubin Negative Negative    Urobilinogen, Urine 0.2 Negative    Nitrite Negative Negative    Leukocyte Esterase Trace (A) Negative    Occult Blood Moderate (A) Negative    Micro Urine Req Microscopic    INFLUENZA A/B BY PCR     Collection Time: 18  4:18 PM   Result Value Ref Range    Influenza virus A RNA POSITIVE (A) Negative    Influenza virus B, PCR Negative Negative   URINE MICROSCOPIC (W/UA)    Collection Time: 18  4:18 PM   Result Value Ref Range    WBC 5-10 (A) /hpf    RBC 20-50 (A) /hpf    Bacteria Moderate (A) None /hpf    Epithelial Cells Many (A) /hpf    Hyaline Cast 3-5 (A) /lpf   PT/INR    Collection Time: 18  7:24 PM   Result Value Ref Range    PT 13.7 12.0 - 14.6 sec    INR 1.08 0.87 - 1.13   PTT    Collection Time: 18  7:24 PM   Result Value Ref Range    APTT 23.1 (L) 24.7 - 36.0 sec   ABO CONFIRMATION    Collection Time: 18  7:24 PM   Result Value Ref Range    Second ABO Typing With Cod O    EKG (ER)    Collection Time: 18  7:30 PM   Result Value Ref Range    Report       [facility] Emergency Dept[facility]    Test Date:  2018  Pt Name:    ODIN KUMARI               Department: ER  MRN:        [id number]                      Room:        11  Gender:     Female                       Technician: [id number]  :        [id number]                   Requested By:ORIN MCDANIELS  Order #:    [id number]                    Reading MD:    Measurements  Intervals                                Axis  Rate:       89                           P:          64  KY:         148                          QRS:        32  QRSD:       84                           T:          18  QT:         416  QTc:        507    Interpretive Statements  SINUS RHYTHM  BORDERLINE LOW VOLTAGE IN FRONTAL LEADS  BORDERLINE ST DEPRESSION, ANTEROLATERAL LEADS  BORDERLINE PROLONGED QT INTERVAL  BASELINE WANDER IN LEAD(S) II,V3  No previous ECG available for comparison         (click the triangle to expand results)    My interpretation of lab results:     Imaging/Procedures Review:    CT-ABDOMEN-PELVIS WITH   Final Result      1.  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      2.   Diverticulosis without evidence of diverticulitis.      3.  Cholelithiasis.      This was discussed with Dr. Sona Mendes at 7:05 PM.      DX-CHEST-LIMITED (1 VIEW)   Final Result         No acute cardiopulmonary abnormalities are identified.          EKG:   per my independent read:  None performed    Assessment and Plan:      * Cecal volvulus (CMS-HCC)- (present on admission)   Assessment & Plan    -Went to the emergency room emergently  -Was found to have confirmed on CT scan with dead cecum, underwent right-sided hemicolectomy which she tolerated the procedure well  -This is been sent to pathology  -Pain control and advance diet per surgery        Lactic acidosis- (present on admission)   Assessment & Plan    -No evidence of sepsis likely secondary to bowel wall edema and necrosis  -Status post surgery as outlined above and continue IV fluid resuscitation        Influenza A- (present on admission)   Assessment & Plan    -Start Tamiflu respiratory isolation as well as supportive care        Hyponatremia- (present on admission)   Assessment & Plan    -Secondary to hypovolemia and dehydration continue IV fluids and trend sodium levels        Hypokalemia- (present on admission)   Assessment & Plan    -Replace orally and check BMP in the morning              1. Prophylaxis: sc heparin  2. Code: Full code per patient with family present  3. Dispo: She will be admitted to inpatient for management that is expected to take greater than 2 midnights    This dictation was created using voice recognition software. The accuracy of the dictation is limited to the abilities of the software. I expect there may be some errors of grammar and possibly content.

## 2018-01-04 NOTE — ED NOTES
Lab called with critical result of flu + at 1825. Critical lab result read back to lab.   Dr. Mendes notified of critical lab result at 1826.  Critical lab result read back by Dr. Mendes.

## 2018-01-04 NOTE — OP REPORT
"Operative Report    Date: 1/3/2018    Surgeon: Leslye Martin M.D.    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 female who is presenting with complaints of abdominal pain. She's been \"sick at home with the flu\" for a few day, but her abdominal pain started a few days after that. Along with this she has had nausea, vomiting for a week, and shortness of breath. She has had loss of appetite. He abdominal pain is all over her abdomen, but worse in her upper abdomen and it does radiate to the back. She has had subjective fevers. She has never had pain like this in past. Denies worsening or alleviating factors. Pain is rated as a 10/10 at its worst. CT scan revealed a cecal volvulus.    An extensive procedures, alternative, risks and questions conference was held with the patient and her family, in regard to the surgical treatment of cecal volvulus. The patient was counseled regarding the benefits of the operation, namely, reduction of the volvulus and prevention of perforation. The patient was made aware of the alternatives, including operative and non-operative management. The risks of bleeding, infection, damage to surrounding structures, need for reoperation, anastomotic leak, stroke, MI, and death were discussed with the patient. The patient was given a chance to ask questions, and all her questions were answered. Discussion was undertaken with Layman's terms. The patient and family demonstrated adequate understanding, seemed pleased with the plan, and wish to proceed. Consent was given in clear state of mind.  Consent was signed.     Findings: cecal volvulus with hemorrhagic necrosis. Performed right hemicolectomy.     Procedure in detail: The patient was identified in the pre-operative holding area and brought to the " operating room. Correct side and site were identified. Pre-operative antibiotics of cefotan were administered prior to the procedure. GETA was smoothly induced. The patient was prepped and draped in the usual sterile fashion.    A midline laparotomy incision was made and the subcutaneous tissues opened with cautery. The fascia was identified and opened sharply.    There was a small amount of free fluid which was evacuated. The bowel was carefully eviscerated, and noted was a cecal volvulus with alex hemorrhagic necrosis, no perforation. The cecum was carefully detorsed.    The ileum was divided proximal to the hemorrhagic portion. The right colon was carefully mobilized along the white line of toldt, and the hepatic flexure mobilized. The necrotic portion extended up to the hepatic flexure, and I desired to make a health anastomosis on the transverse colon, so I moilized the hepatic flexure completely. The colon was then divdided with a stapler at the proximal aspect of the transverse colon. The mesentery of the specimen was divided with the ligasure, and the specimen sent to pathology.    .Performed a side-to-side stapled anastomosis between the ileum and the transverse colon with the 75 mm JAZMIN stapler. The crotch of the anastomosis was supported with vicryl suture. The free ends of the anastomosis were closed with a 60 mm TA stapler. The mesenteric defect was closed with running vicryl.     The abdomen was irrigated with warm saline. The bowels were placed back into the abdomen. The fascia was closed with running PDS, and the skin closed with skin staples.    The patient was awakened from general anesthetic, and was taken to the recovery room in stable condition.    Sponge and needle counts were correct at the end of the case.     Specimen: right colon    EBL: minimal    Dispo: stable, extubated, to PACU    Leslye Martin M.D.  [facility]  [phone]

## 2018-01-04 NOTE — H&P
"Surgical History and Physical    Date: 1/3/2018    Requesting Physician: Dr. Kelechi SANTIAGO  PCP: Stone Kelly M.D.  Attending Physician: Leslye Martin M.D. [facility]    CC: \"I've been sick for a few days\"    HPI: This is a 77 y.o. female who is presenting with complaints of abdominal pain. She's been \"sick at home with the flu\" for a few day, but her abdominal pain started a few days after that. Along with this she has had nausea, vomiting for a week, and shortness of breath. She has had loss of appetite. He abdominal pain is all over her abdomen, but worse in her upper abdomen and it does radiate to the back. She has had subjective fevers. She has never had pain like this in past. Denies worsening or alleviating factors. Pain is rated as a 10/10 at its worst.    PMH: denies    Surgical Hx: tubal pregnancy and appendectomy    Current Facility-Administered Medications   Medication Dose Route Frequency Provider Last Rate Last Dose   • [START ON 1/4/2018] senna-docusate (PERICOLACE or SENOKOT S) 8.6-50 MG per tablet 2 Tab  2 Tab Oral BID Finn Velez M.D.        And   • polyethylene glycol/lytes (MIRALAX) PACKET 1 Packet  1 Packet Oral QDAY PRN Finn Velez M.D.        And   • magnesium hydroxide (MILK OF MAGNESIA) suspension 30 mL  30 mL Oral QDAY PRRUTHY Velez M.D.        And   • bisacodyl (DULCOLAX) suppository 10 mg  10 mg Rectal QDAY PRN Finn Velez M.D.       • Respiratory Care per Protocol   Nebulization Continuous RT Finn Velez M.D.       • NS infusion   Intravenous Continuous Finn Velez M.D.       • ondansetron (ZOFRAN) syringe/vial injection 4 mg  4 mg Intravenous Q4HRS PRRUTHY Velez M.D.       • ondansetron (ZOFRAN ODT) dispertab 4 mg  4 mg Oral Q4HRS PRN Finn Velez M.D.       • morphine (pf) 4 mg/ml injection 1 mg  1 mg Intravenous Q4HRS PRN Finn Velez M.D.       • oseltamivir (TAMIFLU) capsule 75 mg  75 mg Oral Q12HRS Finn ARTHUR" "ANDRADE Velez         Home meds : denies    SoHx: tobacco history, quit 10 days ago, denies etOH/ IVDU    FH: denies    Allergies:  Erythromycin; Flagyl [metronidazole]; Pcn [penicillins]; and Sulfa drugs    Review of Systems:  Constitutional: Negative for fever, chills, weight loss, malaise/fatigue and diaphoresis.   HENT: Negative for hearing loss, ear pain, nosebleeds, congestion, sore throat, neck pain, tinnitus and ear discharge.    Eyes: Negative for blurred vision, double vision, photophobia, pain, discharge and redness.   Respiratory: Negative for cough, hemoptysis, sputum production, shortness of breath, wheezing and stridor.    Cardiovascular: Negative for chest pain, palpitations, orthopnea, claudication, leg swelling and PND.   Gastrointestinal: Negative for heartburn, nausea, vomiting, abdominal pain, diarrhea, constipation, blood in stool and melena.   Genitourinary: Negative for dysuria, urgency, frequency, hematuria and flank pain.   Musculoskeletal: Negative for myalgias, back pain, joint pain and falls.   Skin: Negative for itching and rash.  Neurological: Negative for dizziness, tingling, tremors, sensory change, speech change, focal weakness, seizures, loss of consciousness, weakness and headaches.   Endo/Heme/Allergies: Negative for environmental allergies and polydipsia. Does not bruise/bleed easily.   Psychiatric/Behavioral: Negative for depression, suicidal ideas, hallucinations, memory loss and substance abuse. The patient is not nervous/anxious and does not have insomnia.    Physical Exam:  Blood pressure 150/99, pulse 92, temperature 36.9 °C (98.5 °F), resp. rate (!) 10, height 1.549 m (5' 1\"), weight 61.6 kg (135 lb 12.9 oz), SpO2 93 %.    Constitutional: she is oriented to person, place, and time.  she appears well-developed and well-nourished. No distress.   Head: Normocephalic and atraumatic.   Neck: Normal range of motion. Neck supple. No JVD present. No tracheal deviation present. " No thyromegaly present.   Cardiovascular: Normal rate, regular rhythm, normal heart sounds and intact distal pulses.  Exam reveals no gallop and no friction rub.  No murmur heard.  Pulmonary/Chest: Effort normal and breath sounds normal. No stridor. No respiratory distress. she has no wheezes. she has no rales.   Abdominal: Soft, mildly tender to palpation diffusely, mildly distended. No peritonitis. No hernias or masses.   Musculoskeletal: Normal range of motion. she exhibits no edema and no tenderness.   Neurological: she is alert and oriented to person, place, and time. she has normal reflexes. No cranial nerve deficit. Coordination normal.   Skin: Skin is warm and dry. No rash noted. she is not diaphoretic. No erythema. No pallor.   Psychiatric: she has a normal mood and affect.  Behavior is normal.       Labs:  Recent Labs      01/03/18   1406   WBC  10.6   RBC  4.77   HEMOGLOBIN  15.8   HEMATOCRIT  43.6   MCV  91.4   MCH  33.1*   MCHC  36.2*   RDW  40.4   PLATELETCT  208   MPV  9.1     Recent Labs      01/03/18   1406   SODIUM  130*   POTASSIUM  3.4*   CHLORIDE  94*   CO2  23   GLUCOSE  193*   BUN  16   CREATININE  0.62   CALCIUM  8.8     Recent Labs      01/03/18   1924   APTT  23.1*   INR  1.08     Recent Labs      01/03/18   1406  01/03/18   1924   ASTSGOT  30   --    ALTSGPT  18   --    TBILIRUBIN  0.9   --    ALKPHOSPHAT  59   --    GLOBULIN  2.8   --    INR   --   1.08       Radiology:  1/3/2018 6:33 PM    HISTORY/REASON FOR EXAM:  RUQ Pain  Nausea, vomiting, diarrhea.    TECHNIQUE/EXAM DESCRIPTION:  CT scan of the abdomen and pelvis with contrast.    Contrast-enhanced helical scanning was obtained from the diaphragmatic domes through the pubic symphysis following the bolus administration of 80 mL of Omnipaque 350 nonionic contrast without complication.    Low dose optimization technique was utilized for this CT exam including automated exposure control and adjustment of the mA and/or kV according to  patient size.    COMPARISON: No prior studies available.    FINDINGS:  The visualized lung bases are clear.      CT Abdomen:  The liver and spleen are unremarkable.    The pancreas is unremarkable.    The gallbladder demonstrates stones.    The adrenal glands are not enlarged and the kidneys enhance symmetrically.    There is no lymphadenopathy. There is calcified plaque in the aorta.    The cecum is located in the left upper quadrant is markedly dilated and fluid-filled. There is inflammatory change with free fluid. No free air identified. The cecum is dilated to approximately 8 cm. There is twisting of the mesentery consistent with   volvulus. The main portal vein, splenic vein and superior mesenteric vein are patent. There is a hiatal hernia. There is diverticulosis without evidence of diverticulitis.    CT Pelvis:  There is pelvic ascites.    The appendix is nonvisualized.     Impression       1.  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    2.  Diverticulosis without evidence of diverticulitis.    3.  Cholelithiasis.    This was discussed with Dr. Sona Mendes at 7:05 PM.     The radiologist's interpretation of all images has been reviewed by me.     Assessment: This is a 77 y.o. female with influenza A as well as a cecal volvulus.    An extensive procedures, alternative, risks and questions conference was held with the patient and her family, in regard to the surgical treatment of cecal volvulus. The patient was counseled regarding the benefits of the operation, namely, reduction of the volvulus and prevention of perforation. The patient was made aware of the alternatives, including operative and non-operative management. The risks of bleeding, infection, damage to surrounding structures, need for reoperation, anastomotic leak, stroke, MI, and death were discussed with the patient. The patient was given a chance to ask questions, and all her questions were answered. Discussion was  undertaken with Layman's terms. The patient and family demonstrated adequate understanding, seemed pleased with the plan, and wish to proceed. Consent was given in clear state of mind.  Consent to be signed.     Plan: Urgent laparotomy and anticipated ileocecectomy, and other indicated procedures. Admit to medicine for concurrent management of her influenza A.    Thank you very much for this consultation.    Leslye Martin M.D.  [facility]  [phone]

## 2018-01-05 ENCOUNTER — PATIENT OUTREACH (OUTPATIENT)
Dept: HEALTH INFORMATION MANAGEMENT | Facility: OTHER | Age: 78
End: 2018-01-05

## 2018-01-05 PROCEDURE — 700102 HCHG RX REV CODE 250 W/ 637 OVERRIDE(OP): Performed by: INTERNAL MEDICINE

## 2018-01-05 PROCEDURE — A9270 NON-COVERED ITEM OR SERVICE: HCPCS | Performed by: INTERNAL MEDICINE

## 2018-01-05 PROCEDURE — 99233 SBSQ HOSP IP/OBS HIGH 50: CPT | Performed by: INTERNAL MEDICINE

## 2018-01-05 PROCEDURE — 700102 HCHG RX REV CODE 250 W/ 637 OVERRIDE(OP): Performed by: SURGERY

## 2018-01-05 PROCEDURE — 770021 HCHG ROOM/CARE - ISO PRIVATE

## 2018-01-05 PROCEDURE — 700111 HCHG RX REV CODE 636 W/ 250 OVERRIDE (IP): Performed by: SURGERY

## 2018-01-05 PROCEDURE — A9270 NON-COVERED ITEM OR SERVICE: HCPCS | Performed by: SURGERY

## 2018-01-05 RX ADMIN — OSELTAMIVIR PHOSPHATE 75 MG: 75 CAPSULE ORAL at 20:36

## 2018-01-05 RX ADMIN — ENOXAPARIN SODIUM 40 MG: 100 INJECTION SUBCUTANEOUS at 09:29

## 2018-01-05 RX ADMIN — STANDARDIZED SENNA CONCENTRATE AND DOCUSATE SODIUM 2 TABLET: 8.6; 5 TABLET, FILM COATED ORAL at 09:29

## 2018-01-05 RX ADMIN — HYDROCODONE BITARTRATE AND ACETAMINOPHEN 1 TABLET: 5; 325 TABLET ORAL at 17:09

## 2018-01-05 RX ADMIN — HYDROCODONE BITARTRATE AND ACETAMINOPHEN 2 TABLET: 5; 325 TABLET ORAL at 09:31

## 2018-01-05 RX ADMIN — OSELTAMIVIR PHOSPHATE 75 MG: 75 CAPSULE ORAL at 09:29

## 2018-01-05 ASSESSMENT — ENCOUNTER SYMPTOMS
WHEEZING: 0
VOMITING: 1
COUGH: 0
ABDOMINAL PAIN: 1
EYE REDNESS: 0
SHORTNESS OF BREATH: 1
FALLS: 0
BLOOD IN STOOL: 0
HEMOPTYSIS: 0
EYE PAIN: 0
SEIZURES: 0
FOCAL WEAKNESS: 0
DIARRHEA: 0
LOSS OF CONSCIOUSNESS: 0
HEADACHES: 0
CHILLS: 0
CONSTIPATION: 0
WEAKNESS: 1
NAUSEA: 1
DIZZINESS: 0
PALPITATIONS: 0
FEVER: 0
INSOMNIA: 0
TREMORS: 0
MYALGIAS: 0
NERVOUS/ANXIOUS: 0

## 2018-01-05 ASSESSMENT — PAIN SCALES - GENERAL
PAINLEVEL_OUTOF10: 1
PAINLEVEL_OUTOF10: 6
PAINLEVEL_OUTOF10: 2
PAINLEVEL_OUTOF10: 8
PAINLEVEL_OUTOF10: 2
PAINLEVEL_OUTOF10: 2
PAINLEVEL_OUTOF10: 1

## 2018-01-05 NOTE — PROGRESS NOTES
Awake alert  Feeling better  Abdomen soft    Tolerating clears   No flatus     S/P cecectomy for volvulus   Keep on clears   Advance after return of bowel function   Routine post-op care     Stone Germain MD

## 2018-01-05 NOTE — PROGRESS NOTES
[facility] Hospitalist Progress Note    Date of Service: 2018    Chief Complaint  77 y.o. female admitted 1/3/2018 with Flu Like Symptoms (N/V/D x 1 week, unable to tolerate PO); Abdominal Pain (x 1 week); and Shortness of Breath (Speaking in full sentences, NAD)        Interval Problem Update  Tolerated procedure. Not passed gas yet but doing sips. Minimal pain.    Consultants/Specialty  Dr. Martin, gen surg    Disposition  [facility] [planned        Review of Systems   Constitutional: Positive for malaise/fatigue. Negative for chills and fever.   HENT: Negative for congestion, hearing loss and nosebleeds.    Eyes: Negative for pain and redness.   Respiratory: Positive for shortness of breath. Negative for cough, hemoptysis and wheezing.    Cardiovascular: Negative for chest pain and palpitations.   Gastrointestinal: Positive for abdominal pain, nausea and vomiting. Negative for blood in stool, constipation and diarrhea.   Genitourinary: Negative for dysuria, frequency and hematuria.   Musculoskeletal: Negative for falls, joint pain and myalgias.   Skin: Negative for rash.   Neurological: Positive for weakness. Negative for dizziness, tremors, focal weakness, seizures, loss of consciousness and headaches.   Psychiatric/Behavioral: The patient is not nervous/anxious and does not have insomnia.    All other systems reviewed and are negative.     Physical Exam  Laboratory/Imaging   Hemodynamics  Temp (24hrs), Av.8 °C (98.3 °F), Min:36.3 °C (97.4 °F), Max:37.2 °C (98.9 °F)   Temperature: 36.7 °C (98 °F)  Pulse  Av.4  Min: 81  Max: 94 Heart Rate (Monitored): 89  Blood Pressure : 102/53, NIBP: 150/61      Respiratory      Respiration: 18, Pulse Oximetry: 94 %, O2 Daily Delivery Respiratory : Room Air with O2 Available        RUL Breath Sounds: Clear, RML Breath Sounds: Diminished, RLL Breath Sounds: Diminished, RAYSHAWN Breath Sounds: Clear, LLL Breath Sounds: Diminished    Fluids    Intake/Output Summary (Last 24 hours) at  01/04/18 1602  Last data filed at 01/04/18 1600   Gross per 24 hour   Intake             2240 ml   Output              350 ml   Net             1890 ml       Nutrition  Orders Placed This Encounter   Procedures   • DIET ORDER     Standing Status:   Standing     Number of Occurrences:   1     Order Specific Question:   Diet:     Answer:   Clear Liquid [10]     Physical Exam   Constitutional: She appears well-developed and well-nourished.   Frail elderly   HENT:   Head: Normocephalic and atraumatic.   Eyes: Conjunctivae and EOM are normal. No scleral icterus.   Neck: Normal range of motion. Neck supple.   Cardiovascular: Normal rate and regular rhythm.  Exam reveals no gallop and no friction rub.    No murmur heard.  Pulmonary/Chest: Effort normal and breath sounds normal. No respiratory distress. She has no wheezes. She has no rales.   Abdominal: Soft. Bowel sounds are normal. She exhibits no distension (mild postop). There is tenderness (mild postop soreness). There is no rebound and no guarding.   Bandages CDI   Musculoskeletal: She exhibits no edema or tenderness.   Neurological: She is alert.   Skin: Skin is warm.   Psychiatric: She has a normal mood and affect. Her behavior is normal.       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 Martin  Once she passes gas, diet to be advanced to clears  PT/OT ordered.         Lactic acidosis- (present on admission)   Assessment & Plan    -No evidence of sepsis likely secondary to bowel wall edema and necrosis  -Status post surgery as outlined above and continue IV fluid resuscitation  Only mild elevation.         Influenza A- (present on admission)   Assessment & Plan    Tamiflu respiratory isolation as well as supportive care        Hyponatremia- (present on admission)   Assessment & Plan    -Secondary to hypovolemia and dehydration continue IV fluids and trend sodium levels  Ordered labs        Hypokalemia- (present on admission)   Assessment & Plan    -Replace orally and check BMP in the morning  Ordered labs.        I spent 37 minutes, reviewing the chart, notes, vitals, labs, imaging, ordering labs, evaluating Lyric Belle for assessment, enacting the plan above. 50% of the time was spent in counseling Lyric Belle and answering questions. Time was devoted to counseling and coordinating care including review of records, pertinent lab data and studies, as well as discussing diagnostic evaluation and work up, planned therapeutic interventions and future disposition of care. Where indicated, the assessment and plan reflect discussion of patient with consultants, other healthcare providers, family members, and additional research needed to obtain further information in formulating the plan of care for Lyric Belle.       DVT prophylaxis pharmacological::  Enoxaparin (Lovenox)

## 2018-01-05 NOTE — PROGRESS NOTES
Received report from day shift RN. Assumed patient care.  AOx4  Droplet precautions in place for influenza  Complains of abdominal pain rated at a 5/10, medicated per MAR.  Midline abdominal incision covered with island dressing, no drainage noted.  Tolerating clear liquid diet. No nausea/vomiting.  Hypoactive bowel sounds in all 4 quadrants. - flatus - BM  Bed locked and in lowest position. Call light within reach. All needs met at this time.

## 2018-01-05 NOTE — PROGRESS NOTES
Pt A&Ox4, sitting up in bed. C/o 8/10 abdominal pain and describes as soreness, medicated per MAR. Saturating >90% on RA at this time. Pt on droplet precautions for influenza A. Pt has productive cough, lung sounds clear and expiratory wheeze in bases. Encouraged deep breathing and coughing. IS at bedside. Midline incision to abdomen with island dressing, old drainage present. Abdominal binder placed. Pt tolerating clear liquid diet with no n/v, normoactive bowel sounds in all 4 quadrants. -flatus, -BM. Safety education provided. Bed locked in low. Call light within reach. Pt call appropriately for assistance. Ambulates SBA. Hourly rounding in place. All needs met at this time.

## 2018-01-05 NOTE — CARE PLAN
Problem: Venous Thromboembolism (VTW)/Deep Vein Thrombosis (DVT) Prevention:  Goal: Patient will participate in Venous Thrombosis (VTE)/Deep Vein Thrombosis (DVT)Prevention Measures  Outcome: PROGRESSING AS EXPECTED  SCDs on     Problem: Bowel/Gastric:  Goal: Normal bowel function is maintained or improved  Outcome: PROGRESSING AS EXPECTED  Patient tolerating clear liquid diet without nausea or vomiting. - flatus

## 2018-01-06 LAB
ANION GAP SERPL CALC-SCNC: 6 MMOL/L (ref 0–11.9)
BUN SERPL-MCNC: 12 MG/DL (ref 8–22)
CALCIUM SERPL-MCNC: 7.7 MG/DL (ref 8.5–10.5)
CHLORIDE SERPL-SCNC: 101 MMOL/L (ref 96–112)
CO2 SERPL-SCNC: 28 MMOL/L (ref 20–33)
CREAT SERPL-MCNC: 0.59 MG/DL (ref 0.5–1.4)
ERYTHROCYTE [DISTWIDTH] IN BLOOD BY AUTOMATED COUNT: 44.7 FL (ref 35.9–50)
GFR SERPL CREATININE-BSD FRML MDRD: >60 ML/MIN/1.73 M 2
GLUCOSE SERPL-MCNC: 97 MG/DL (ref 65–99)
HCT VFR BLD AUTO: 30 % (ref 37–47)
HGB BLD-MCNC: 10.6 G/DL (ref 12–16)
MCH RBC QN AUTO: 33.8 PG (ref 27–33)
MCHC RBC AUTO-ENTMCNC: 35.5 G/DL (ref 33.6–35)
MCV RBC AUTO: 95.2 FL (ref 81.4–97.8)
PLATELET # BLD AUTO: 187 K/UL (ref 164–446)
PMV BLD AUTO: 9.4 FL (ref 9–12.9)
POTASSIUM SERPL-SCNC: 3 MMOL/L (ref 3.6–5.5)
RBC # BLD AUTO: 3.14 M/UL (ref 4.2–5.4)
SODIUM SERPL-SCNC: 135 MMOL/L (ref 135–145)
WBC # BLD AUTO: 7.5 K/UL (ref 4.8–10.8)

## 2018-01-06 PROCEDURE — 85027 COMPLETE CBC AUTOMATED: CPT

## 2018-01-06 PROCEDURE — 700111 HCHG RX REV CODE 636 W/ 250 OVERRIDE (IP): Performed by: SURGERY

## 2018-01-06 PROCEDURE — A9270 NON-COVERED ITEM OR SERVICE: HCPCS | Performed by: INTERNAL MEDICINE

## 2018-01-06 PROCEDURE — 700102 HCHG RX REV CODE 250 W/ 637 OVERRIDE(OP): Performed by: INTERNAL MEDICINE

## 2018-01-06 PROCEDURE — 99231 SBSQ HOSP IP/OBS SF/LOW 25: CPT | Performed by: INTERNAL MEDICINE

## 2018-01-06 PROCEDURE — A9270 NON-COVERED ITEM OR SERVICE: HCPCS | Performed by: SURGERY

## 2018-01-06 PROCEDURE — 770021 HCHG ROOM/CARE - ISO PRIVATE

## 2018-01-06 PROCEDURE — 700102 HCHG RX REV CODE 250 W/ 637 OVERRIDE(OP): Performed by: SURGERY

## 2018-01-06 PROCEDURE — 700111 HCHG RX REV CODE 636 W/ 250 OVERRIDE (IP): Performed by: INTERNAL MEDICINE

## 2018-01-06 PROCEDURE — 80048 BASIC METABOLIC PNL TOTAL CA: CPT

## 2018-01-06 PROCEDURE — 36415 COLL VENOUS BLD VENIPUNCTURE: CPT

## 2018-01-06 RX ADMIN — OSELTAMIVIR PHOSPHATE 75 MG: 75 CAPSULE ORAL at 19:27

## 2018-01-06 RX ADMIN — HYDROCODONE BITARTRATE AND ACETAMINOPHEN 2 TABLET: 5; 325 TABLET ORAL at 19:31

## 2018-01-06 RX ADMIN — ONDANSETRON 4 MG: 2 INJECTION INTRAMUSCULAR; INTRAVENOUS at 19:36

## 2018-01-06 RX ADMIN — HYDROCODONE BITARTRATE AND ACETAMINOPHEN 2 TABLET: 5; 325 TABLET ORAL at 07:53

## 2018-01-06 RX ADMIN — OSELTAMIVIR PHOSPHATE 75 MG: 75 CAPSULE ORAL at 07:53

## 2018-01-06 RX ADMIN — ENOXAPARIN SODIUM 40 MG: 100 INJECTION SUBCUTANEOUS at 07:53

## 2018-01-06 ASSESSMENT — ENCOUNTER SYMPTOMS
CHILLS: 0
PALPITATIONS: 0
WEAKNESS: 1
FEVER: 0
DIARRHEA: 0
VOMITING: 1
LOSS OF CONSCIOUSNESS: 0
EYE PAIN: 0
COUGH: 0
TREMORS: 0
FALLS: 0
BLOOD IN STOOL: 0
CONSTIPATION: 0
WHEEZING: 0
SEIZURES: 0
NERVOUS/ANXIOUS: 0
EYE REDNESS: 0
FOCAL WEAKNESS: 0
INSOMNIA: 0
ABDOMINAL PAIN: 1
NAUSEA: 1
MYALGIAS: 0
SHORTNESS OF BREATH: 1
DIZZINESS: 0
HEMOPTYSIS: 0
HEADACHES: 0

## 2018-01-06 ASSESSMENT — PAIN SCALES - GENERAL
PAINLEVEL_OUTOF10: 0
PAINLEVEL_OUTOF10: 2
PAINLEVEL_OUTOF10: 2
PAINLEVEL_OUTOF10: 3
PAINLEVEL_OUTOF10: 2
PAINLEVEL_OUTOF10: 7
PAINLEVEL_OUTOF10: 2
PAINLEVEL_OUTOF10: 8

## 2018-01-06 NOTE — PROGRESS NOTES
[facility] Hospitalist Progress Note    Date of Service: 2018    Chief Complaint  77 y.o. female admitted 1/3/2018 with Flu Like Symptoms (N/V/D x 1 week, unable to tolerate PO); Abdominal Pain (x 1 week); and Shortness of Breath (Speaking in full sentences, NAD)        Interval Problem Update  No passed gas yet. No other complaints.    Consultants/Specialty  Dr. Martin, gen surg    [facility] ordered        Review of Systems   Constitutional: Positive for malaise/fatigue. Negative for chills and fever.   HENT: Negative for congestion, hearing loss and nosebleeds.    Eyes: Negative for pain and redness.   Respiratory: Positive for shortness of breath. Negative for cough, hemoptysis and wheezing.    Cardiovascular: Negative for chest pain and palpitations.   Gastrointestinal: Positive for abdominal pain, nausea and vomiting. Negative for blood in stool, constipation and diarrhea.   Genitourinary: Negative for dysuria, frequency and hematuria.   Musculoskeletal: Negative for falls, joint pain and myalgias.   Skin: Negative for rash.   Neurological: Positive for weakness. Negative for dizziness, tremors, focal weakness, seizures, loss of consciousness and headaches.   Psychiatric/Behavioral: The patient is not nervous/anxious and does not have insomnia.    All other systems reviewed and are negative.     Physical Exam  Laboratory/Imaging   Hemodynamics  Temp (24hrs), Av.2 °C (98.9 °F), Min:36.8 °C (98.3 °F), Max:37.4 °C (99.3 °F)   Temperature: 37.4 °C (99.3 °F)  Pulse  Av.4  Min: 80  Max: 94    Blood Pressure : 118/68      Respiratory      Respiration: 17, Pulse Oximetry: 92 %        RUL Breath Sounds: Clear, RML Breath Sounds: Expiratory Wheezes, RLL Breath Sounds: Diminished;Expiratory Wheezes, RAYSHAWN Breath Sounds: Clear, LLL Breath Sounds: Diminished;Expiratory Wheezes    Fluids    Intake/Output Summary (Last 24 hours) at 18 1723  Last data filed at 18 1600   Gross per 24 hour   Intake               840 ml   Output             1650 ml   Net             -810 ml       Nutrition  Orders Placed This Encounter   Procedures   • DIET ORDER     Standing Status:   Standing     Number of Occurrences:   1     Order Specific Question:   Diet:     Answer:   Regular [1]     Physical Exam   Constitutional: She appears well-developed and well-nourished.   Frail elderly   HENT:   Head: Normocephalic and atraumatic.   Eyes: Conjunctivae and EOM are normal. No scleral icterus.   Neck: Normal range of motion. Neck supple.   Cardiovascular: Normal rate and regular rhythm.  Exam reveals no gallop and no friction rub.    No murmur heard.  Pulmonary/Chest: Effort normal and breath sounds normal. No respiratory distress. She has no wheezes. She has no rales.   Abdominal: Soft. Bowel sounds are normal. She exhibits no distension (mild postop). There is tenderness (mild postop soreness). There is no rebound and no guarding.   Bandages CDI   Musculoskeletal: She exhibits no edema or tenderness.   Neurological: She is alert.   Skin: Skin is warm.   Psychiatric: She has a normal mood and affect. Her behavior is normal.       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 Martin  Once she passes gas, diet to be advanced to clears  PT/OT ordered.         Lactic acidosis- (present on admission)   Assessment & Plan    -No evidence of sepsis likely secondary to bowel wall edema and necrosis  -Status post surgery as outlined above and continue IV fluid resuscitation  Only mild elevation.         Influenza A- (present on admission)   Assessment & Plan    Tamiflu respiratory isolation as well as supportive care        Hyponatremia- (present on admission)   Assessment & Plan    -Secondary to hypovolemia and dehydration continue IV fluids and trend sodium levels  Ordered labs        Hypokalemia- (present on admission)   Assessment & Plan    -Replace orally and check BMP in the morning  Ordered labs.              DVT prophylaxis pharmacological::  Enoxaparin (Lovenox)

## 2018-01-06 NOTE — PROGRESS NOTES
Pt A&Ox4, sitting up in bed. States her pain is more sore especially when she coughs, medicated per MAR. Saturating >90% on RA while awake. When pt is asleep she requires 1.5L NC to maintain sats >90%. Encouraged coughing and deep breathing. Able to pull IS to 1500. Pt on droplet precautions for influenza A. Pt has productive cough, lung sounds clear and expiratory wheeze in bases. IS at bedside. Midline incision to abdomen with island dressing, dressing CDI. Abdominal binder in place. Pt tolerating regular diet with no n/v, normoactive bowel sounds in all 4 quadrants. LBM yesterday. Safety education provided. Bed locked in low. Call light within reach. Pt call appropriately for assistance. Ambulates SBA. Hourly rounding in place. All needs met at this time.   Pt is hopeful to go home today. Will discuss with MD.

## 2018-01-06 NOTE — CARE PLAN
Problem: Bowel/Gastric:  Goal: Normal bowel function is maintained or improved  Outcome: PROGRESSING AS EXPECTED  Pt having loose BM. +BS, -n/v. Tolerating regular diet.     Problem: Discharge Barriers/Planning  Goal: Patient's continuum of care needs will be met  Outcome: PROGRESSING AS EXPECTED  Decreasing O2 needs for d/c tomorrow.     Problem: Pain Management  Goal: Pain level will decrease to patient's comfort goal  Outcome: PROGRESSING AS EXPECTED  Controlled with PO medications PRN

## 2018-01-06 NOTE — PROGRESS NOTES
Patient AxO x4, VSS on 1.5L, denies need for pain interventions at this time. Denies n/v, tolerating diet. MLI to abd CDI, abd binder in place. Patient voiding adequately, had multiple loose BMs today, refused scheduled stool softener. All needs met at this time, patient is tired and wanting to rest. Call light in reach.     Patient refuses to have a bed alarm despite education, patient calls appropriately. Nonslip socks in place, bed in locked lowest position.

## 2018-01-06 NOTE — PROGRESS NOTES
"  S: Awake and alert, reports some pain at the incision site, reports having several bowel movements yesterday    O: Abdomen soft, incision clean dry and intact  Blood pressure 118/72, pulse 77, temperature 36.8 °C (98.3 °F), resp. rate 18, height 1.549 m (5' 1\"), weight 61.6 kg (135 lb 12.9 oz), SpO2 93 %, not currently breastfeeding.    Intake/Output Summary (Last 24 hours) at 01/06/18 0841  Last data filed at 01/06/18 0710   Gross per 24 hour   Intake              960 ml   Output              550 ml   Net              410 ml     Recent Labs      01/03/18   1406  01/06/18   0405   WBC  10.6  7.5   RBC  4.77  3.14*   HEMOGLOBIN  15.8  10.6*   HEMATOCRIT  43.6  30.0*   MCV  91.4  95.2   MCH  33.1*  33.8*   MCHC  36.2*  35.5*   RDW  40.4  44.7   PLATELETCT  208  187   MPV  9.1  9.4     Recent Labs      01/03/18   1406  01/06/18   0405   SODIUM  130*  135   POTASSIUM  3.4*  3.0*   CHLORIDE  94*  101   CO2  23  28   GLUCOSE  193*  97   BUN  16  12   CREATININE  0.62  0.59   CALCIUM  8.8  7.7*     Recent Labs      01/03/18   1406  01/06/18   0405   SODIUM  130*  135   POTASSIUM  3.4*  3.0*   CHLORIDE  94*  101   CO2  23  28   GLUCOSE  193*  97   BUN  16  12     Recent Labs      01/03/18   1924   APTT  23.1*   INR  1.08     Recent Labs      01/03/18   1406   BNPBTYPENAT  671*       Current Facility-Administered Medications   Medication Dose   • enoxaparin (LOVENOX) inj 40 mg  40 mg   • hydrocodone-acetaminophen (NORCO) 5-325 MG per tablet 1-2 Tab  1-2 Tab   • senna-docusate (PERICOLACE or SENOKOT S) 8.6-50 MG per tablet 2 Tab  2 Tab    And   • polyethylene glycol/lytes (MIRALAX) PACKET 1 Packet  1 Packet    And   • magnesium hydroxide (MILK OF MAGNESIA) suspension 30 mL  30 mL    And   • bisacodyl (DULCOLAX) suppository 10 mg  10 mg   • Respiratory Care per Protocol     • ondansetron (ZOFRAN) syringe/vial injection 4 mg  4 mg   • ondansetron (ZOFRAN ODT) dispertab 4 mg  4 mg   • morphine (pf) 4 mg/ml injection 1 mg  1 mg "   • oseltamivir (TAMIFLU) capsule 75 mg  75 mg       A:  Active Hospital Problems    Diagnosis   • Cecal volvulus (CMS-HCC) [K56.2]     Priority: High   • Hypokalemia [E87.6]   • Hyponatremia [E87.1]   • Influenza A [J10.1]   • Lactic acidosis [E87.2]       P:Regular diet today, ambulate, consider discharge tomorrow

## 2018-01-06 NOTE — CARE PLAN
Problem: Venous Thromboembolism (VTW)/Deep Vein Thrombosis (DVT) Prevention:  Goal: Patient will participate in Venous Thrombosis (VTE)/Deep Vein Thrombosis (DVT)Prevention Measures  Outcome: PROGRESSING AS EXPECTED  SCD's in place, lovenox, ambulation    Problem: Bowel/Gastric:  Goal: Normal bowel function is maintained or improved  Outcome: PROGRESSING AS EXPECTED  +BM today. No nausea. Diet advanced today to regular    Problem: Pain Management  Goal: Pain level will decrease to patient's comfort goal  Outcome: PROGRESSING AS EXPECTED  Well controlled with PO medications PRN

## 2018-01-06 NOTE — CARE PLAN
Problem: Bowel/Gastric:  Goal: Normal bowel function is maintained or improved  Outcome: PROGRESSING AS EXPECTED  Patient had a BM today, dressing to MLI is CDI. Abd binder in place.     Problem: Pain Management  Goal: Pain level will decrease to patient's comfort goal  Outcome: PROGRESSING AS EXPECTED  Pain managed with PRN pain medications.

## 2018-01-06 NOTE — DISCHARGE PLANNING
Medical Social Work    Referral: HH    Intervention: SW notified in rounds that pt refused HH.     Plan: SW to follow for further needs. Pt anticipated to dc home Sunday.

## 2018-01-07 ENCOUNTER — PATIENT OUTREACH (OUTPATIENT)
Dept: HEALTH INFORMATION MANAGEMENT | Facility: OTHER | Age: 78
End: 2018-01-07

## 2018-01-07 VITALS
WEIGHT: 135.8 LBS | TEMPERATURE: 98.3 F | DIASTOLIC BLOOD PRESSURE: 64 MMHG | HEIGHT: 61 IN | SYSTOLIC BLOOD PRESSURE: 121 MMHG | HEART RATE: 68 BPM | BODY MASS INDEX: 25.64 KG/M2 | OXYGEN SATURATION: 93 % | RESPIRATION RATE: 16 BRPM

## 2018-01-07 LAB
ANION GAP SERPL CALC-SCNC: 9 MMOL/L (ref 0–11.9)
BUN SERPL-MCNC: 11 MG/DL (ref 8–22)
CALCIUM SERPL-MCNC: 7.8 MG/DL (ref 8.5–10.5)
CHLORIDE SERPL-SCNC: 100 MMOL/L (ref 96–112)
CO2 SERPL-SCNC: 28 MMOL/L (ref 20–33)
CREAT SERPL-MCNC: 0.46 MG/DL (ref 0.5–1.4)
ERYTHROCYTE [DISTWIDTH] IN BLOOD BY AUTOMATED COUNT: 43.4 FL (ref 35.9–50)
GFR SERPL CREATININE-BSD FRML MDRD: >60 ML/MIN/1.73 M 2
GLUCOSE SERPL-MCNC: 98 MG/DL (ref 65–99)
HCT VFR BLD AUTO: 29.8 % (ref 37–47)
HGB BLD-MCNC: 10.6 G/DL (ref 12–16)
MCH RBC QN AUTO: 33.2 PG (ref 27–33)
MCHC RBC AUTO-ENTMCNC: 35.6 G/DL (ref 33.6–35)
MCV RBC AUTO: 93.4 FL (ref 81.4–97.8)
PLATELET # BLD AUTO: 243 K/UL (ref 164–446)
PMV BLD AUTO: 8.9 FL (ref 9–12.9)
POTASSIUM SERPL-SCNC: 3.2 MMOL/L (ref 3.6–5.5)
RBC # BLD AUTO: 3.19 M/UL (ref 4.2–5.4)
SODIUM SERPL-SCNC: 137 MMOL/L (ref 135–145)
WBC # BLD AUTO: 7.5 K/UL (ref 4.8–10.8)

## 2018-01-07 PROCEDURE — A9270 NON-COVERED ITEM OR SERVICE: HCPCS | Performed by: SURGERY

## 2018-01-07 PROCEDURE — 700111 HCHG RX REV CODE 636 W/ 250 OVERRIDE (IP): Performed by: INTERNAL MEDICINE

## 2018-01-07 PROCEDURE — 700111 HCHG RX REV CODE 636 W/ 250 OVERRIDE (IP): Performed by: SURGERY

## 2018-01-07 PROCEDURE — 700102 HCHG RX REV CODE 250 W/ 637 OVERRIDE(OP): Performed by: SURGERY

## 2018-01-07 PROCEDURE — 80048 BASIC METABOLIC PNL TOTAL CA: CPT

## 2018-01-07 PROCEDURE — 700102 HCHG RX REV CODE 250 W/ 637 OVERRIDE(OP): Performed by: INTERNAL MEDICINE

## 2018-01-07 PROCEDURE — 99239 HOSP IP/OBS DSCHRG MGMT >30: CPT | Performed by: INTERNAL MEDICINE

## 2018-01-07 PROCEDURE — 36415 COLL VENOUS BLD VENIPUNCTURE: CPT

## 2018-01-07 PROCEDURE — A9270 NON-COVERED ITEM OR SERVICE: HCPCS | Performed by: INTERNAL MEDICINE

## 2018-01-07 PROCEDURE — 85027 COMPLETE CBC AUTOMATED: CPT

## 2018-01-07 RX ORDER — POLYETHYLENE GLYCOL 3350 17 G/17G
17 POWDER, FOR SOLUTION ORAL
Refills: 3 | COMMUNITY
Start: 2018-01-07

## 2018-01-07 RX ORDER — HYDROCODONE BITARTRATE AND ACETAMINOPHEN 5; 325 MG/1; MG/1
1-2 TABLET ORAL EVERY 6 HOURS PRN
Qty: 20 TAB | Refills: 0 | Status: SHIPPED | OUTPATIENT
Start: 2018-01-07 | End: 2018-01-21

## 2018-01-07 RX ORDER — AMOXICILLIN 250 MG
2 CAPSULE ORAL 2 TIMES DAILY
Qty: 30 TAB | Refills: 0 | Status: SHIPPED | OUTPATIENT
Start: 2018-01-07

## 2018-01-07 RX ORDER — OSELTAMIVIR PHOSPHATE 75 MG/1
75 CAPSULE ORAL EVERY 12 HOURS
Qty: 10 CAP | Refills: 0 | Status: SHIPPED | OUTPATIENT
Start: 2018-01-07 | End: 2018-01-08

## 2018-01-07 RX ADMIN — ONDANSETRON 4 MG: 2 INJECTION INTRAMUSCULAR; INTRAVENOUS at 08:08

## 2018-01-07 RX ADMIN — HYDROCODONE BITARTRATE AND ACETAMINOPHEN 2 TABLET: 5; 325 TABLET ORAL at 08:08

## 2018-01-07 RX ADMIN — ENOXAPARIN SODIUM 40 MG: 100 INJECTION SUBCUTANEOUS at 08:02

## 2018-01-07 RX ADMIN — STANDARDIZED SENNA CONCENTRATE AND DOCUSATE SODIUM 2 TABLET: 8.6; 5 TABLET, FILM COATED ORAL at 08:02

## 2018-01-07 RX ADMIN — OSELTAMIVIR PHOSPHATE 75 MG: 75 CAPSULE ORAL at 08:02

## 2018-01-07 ASSESSMENT — PAIN SCALES - GENERAL: PAINLEVEL_OUTOF10: 8

## 2018-01-07 NOTE — PROGRESS NOTES
"Pt states she does not want to get established with the clinic. Pt does not want to wait for a PCP to be established. Pt states she can \"care for the wound myself.\"     Hospitalitis, SW and RN educated the pt that HH would be best for her care. Pt refusing at this time. Pts family is to be educated by RN about dressing changes. MD aware.    Pt states she will establish a PCP \"when I get home.\"   "

## 2018-01-07 NOTE — CARE PLAN
Problem: Infection  Goal: Will remain free from infection  Outcome: PROGRESSING AS EXPECTED  Pt educated about droplet percautions     Problem: Mobility  Goal: Risk for activity intolerance will decrease  Outcome: PROGRESSING AS EXPECTED  Pt educated on benefits of ambulating

## 2018-01-07 NOTE — PROGRESS NOTES
Pt discharged with family in W/C. Prescriptions were given x 2. D/C instructions signed and placed in chart. Chart given to U/C. Charge notified. Medications from pts drawer returned to pharmacy. Core measure sheet complete. Controlled substance use informed consent complete. Pt discharged with son who will be taking care of her at home. Family aware of dressing change and all follow up appointments

## 2018-01-07 NOTE — PROGRESS NOTES
[facility] Hospitalist Progress Note    Date of Service: 2018    Chief Complaint  77 y.o. female admitted 1/3/2018 with Flu Like Symptoms (N/V/D x 1 week, unable to tolerate PO); Abdominal Pain (x 1 week); and Shortness of Breath (Speaking in full sentences, NAD)        Interval Problem Update  Improving slowly. COnsidering d/c tomorrow.    Consultants/Specialty  Dr. Martin, gen surg    Disposition  [facility] ordered but I was told she was declining.        Review of Systems   Constitutional: Positive for malaise/fatigue. Negative for chills and fever.   HENT: Negative for congestion, hearing loss and nosebleeds.    Eyes: Negative for pain and redness.   Respiratory: Positive for shortness of breath. Negative for cough, hemoptysis and wheezing.    Cardiovascular: Negative for chest pain and palpitations.   Gastrointestinal: Positive for abdominal pain, nausea and vomiting. Negative for blood in stool, constipation and diarrhea.   Genitourinary: Negative for dysuria, frequency and hematuria.   Musculoskeletal: Negative for falls, joint pain and myalgias.   Skin: Negative for rash.   Neurological: Positive for weakness. Negative for dizziness, tremors, focal weakness, seizures, loss of consciousness and headaches.   Psychiatric/Behavioral: The patient is not nervous/anxious and does not have insomnia.    All other systems reviewed and are negative.     Physical Exam  Laboratory/Imaging   Hemodynamics  Temp (24hrs), Av.8 °C (98.2 °F), Min:36.1 °C (97 °F), Max:37.2 °C (98.9 °F)   Temperature: 37.2 °C (98.9 °F)  Pulse  Av.5  Min: 70  Max: 94    Blood Pressure : 110/54      Respiratory      Respiration: 18, Pulse Oximetry: 99 %        RUL Breath Sounds: Clear, RML Breath Sounds: Diminished, RLL Breath Sounds: Expiratory Wheezes;Diminished, RAYSHAWN Breath Sounds: Clear, LLL Breath Sounds: Expiratory Wheezes;Diminished    Fluids    Intake/Output Summary (Last 24 hours) at 18 6110  Last data filed at 18 1600    Gross per 24 hour   Intake             1460 ml   Output                0 ml   Net             1460 ml       Nutrition  Orders Placed This Encounter   Procedures   • DIET ORDER     Standing Status:   Standing     Number of Occurrences:   1     Order Specific Question:   Diet:     Answer:   Regular [1]     Physical Exam   Constitutional: She appears well-developed and well-nourished.   Frail elderly   HENT:   Head: Normocephalic and atraumatic.   Eyes: Conjunctivae and EOM are normal. No scleral icterus.   Neck: Normal range of motion. Neck supple.   Cardiovascular: Normal rate and regular rhythm.  Exam reveals no gallop and no friction rub.    No murmur heard.  Pulmonary/Chest: Effort normal and breath sounds normal. No respiratory distress. She has no wheezes. She has no rales.   Abdominal: Soft. Bowel sounds are normal. She exhibits no distension (mild postop). There is tenderness (mild postop soreness). There is no rebound and no guarding.   Bandages CDI   Musculoskeletal: She exhibits no edema or tenderness.   Neurological: She is alert.   Skin: Skin is warm.   Psychiatric: She has a normal mood and affect. Her behavior is normal.       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 Martin  Once she passes gas, diet to be advanced to clears  PT/OT ordered.   HHC ordered but she seems to be declining        Lactic acidosis- (present on admission)   Assessment & Plan    -No evidence of sepsis likely secondary to bowel wall edema and necrosis  -Status post surgery as outlined above and continue IV fluid resuscitation  Only mild elevation.         Influenza A- (present on admission)   Assessment & Plan    Tamiflu respiratory isolation as well as supportive care        Hyponatremia- (present on admission)   Assessment & Plan    -Secondary to hypovolemia and dehydration continue IV fluids and trend sodium levels  Ordered labs        Hypokalemia- (present on admission)   Assessment & Plan    -Replace orally and check BMP in the morning  Ordered labs.              DVT prophylaxis pharmacological::  Enoxaparin (Lovenox)

## 2018-01-07 NOTE — PROGRESS NOTES
Bedside report received.  Assessment complete.  A&O x 4. Patient calls appropriately.  Denies numbness and tingling.   Patient up with no assist. Bed alarm refused. Pt educated..   Patient has 8/10 pain. Pain while coughing.  Denies N&V. Tolerating regular diet.  Surgical incision to midline. Dressing, CDI. Abd binder in place.   + void, + flatus  Patient denies SOB.  Review plan with of care with patient. Call light and personal belongings with in reach. Hourly rounding in place. All needs met at this time.

## 2018-01-07 NOTE — FACE TO FACE
Face to Face Supporting Documentation - Home Health    The encounter with this patient was in whole or in part the primary reason for home health admission.    Date of encounter:   Patient:                    MRN:                       YOB: 2016  Lyric Belle  [id number]  1940     Home health to see patient for:  Wound Care    Skilled need for:  Surgical Aftercare wound care    Skilled nursing interventions to include:  Wound Care    Homebound status evidenced by:  Needs the assistance of another person in order to leave the home. Leaving home requires a considerable and taxing effort. There is a normal inability to leave the home.    Community Physician to provide follow up care: Stone Kelly M.D.     Optional Interventions? No      I certify the face to face encounter for this home health care referral meets the CMS requirements and the encounter/clinical assessment with the patient was, in whole, or in part, for the medical condition(s) listed above, which is the primary reason for home health care. Based on my clinical findings: the service(s) are medically necessary, support the need for home health care, and the homebound criteria are met.  I certify that this patient has had a face to face encounter by myself.  Jose Ly M.D. - NPI: [id number]

## 2018-01-07 NOTE — PROGRESS NOTES
Patient AxO x4, VSS on 1.5L, medicated for 8/10 pain in the abd while coughing. Patient has no emesis, but states she felt nauseous, medicated with zofran. Patient didn't eat much of dinner, encouraged to call if she wants to eat anything. MLI to abd, CDI, abd binder in place. Voiding adequately, had a BM today. +Gas, +BS. All needs met at this time, call light in reach.     Patient refuses to have a bed alarm despite education, call light in reach. [place] socks in place, bed in locked lowest position.

## 2018-01-07 NOTE — PROGRESS NOTES
Surgery     Feeling well   Tolerating diet   Incision OK     OK to discharge   Follow-up Dr. Martin one week

## 2018-01-07 NOTE — CARE PLAN
Problem: Respiratory:  Goal: Respiratory status will improve  Outcome: PROGRESSING AS EXPECTED  Patient on 1.5L of O2, will attempt to wean off. Patient pulls 1500 on IS, encouraged to use 10x an hour.     Problem: Pain Management  Goal: Pain level will decrease to patient's comfort goal  Outcome: PROGRESSING AS EXPECTED  Pain controlled with PRN pain medications.

## 2018-01-07 NOTE — DISCHARGE SUMMARY
CHIEF COMPLAINT ON ADMISSION  Chief Complaint   Patient presents with   • Flu Like Symptoms     N/V/D x 1 week, unable to tolerate PO   • Abdominal Pain     x 1 week   • Shortness of Breath     Speaking in full sentences, NAD       CODE STATUS  Full Code    HPI & HOSPITAL COURSE  Please review Dr. Leslye Martin M.D. notes for further details of history of present illness, past medical/social/family histories, allergies and medications. Please review Dr. Martin, dr. Germain, surgery consultation notes.    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 Martin  Once she passes gas, diet to be advanced to clears  PT/OT ordered.   HHC ordered  At discharge date she tolerated regular diet and has bowel movement. She walked with PT. Dr. Germain cleared her for discharge. She wants to go home with home health care for dressing changes and help with wound care; she did say she can have family help her as well. SW to discuss.  Follow up with Stone Kelly M.D. In 1-2 weeks  Follow up with Dr. Germain, in 1 week for postop visit        Influenza A- (present on admission)   Assessment & Plan    Tamiflu respiratory isolation as well as supportive care        Lactic acidosis- (present on admission)   Assessment & Plan    -No evidence of sepsis likely secondary to bowel wall edema and necrosis  -Status post surgery as outlined above and continue IV fluid resuscitation  Only mild elevation  OK for discharge.        Hyponatremia- (present on admission)   Assessment & Plan    -Secondary to hypovolemia and dehydration continue IV fluids and trend sodium levels  Ordered labs  Resolved.        Hypokalemia-  (present on admission)   Assessment & Plan    -Replace orally and check BMP in the morning  Ordered labs.  Mildly low, should improve as her diet now advanced to regular.            Discharge Physical Exam      Therefore, she is discharged in good and stable condition with close outpatient follow-up.    SPECIFIC OUTPATIENT FOLLOW-UP  Follow up with Stone Kelly M.D. In 1-2 weeks  Follow up with Dr. Germain, in 1 week for postop visit    FOLLOW UP  No future appointments.  Stone Kelly M.D.  [address] [facility] [address]  [phone]    Schedule an appointment as soon as possible for a visit in 1 week  Dr. Kelly is now retired. Please call the office to establish with a new primary care provider.      MEDICATIONS ON DISCHARGE   Lyric Belle   Home Medication Instructions DRAKE:[id number]    Printed on:01/07/18 1030   Medication Information                      hydrocodone-acetaminophen (NORCO) 5-325 MG Tab per tablet  Take 1-2 Tabs by mouth every 6 hours as needed for up to 14 days.             oseltamivir (TAMIFLU) 75 MG Cap  Take 1 Cap by mouth every 12 hours for 1 day.             polyethylene glycol/lytes (MIRALAX) Pack  Take 1 Packet by mouth 1 time daily as needed (if sennosides and docusate ineffective after 24 hours).             senna-docusate (PERICOLACE OR SENOKOT S) 8.6-50 MG Tab  Take 2 Tabs by mouth 2 Times a Day.                 DIET  Orders Placed This Encounter   Procedures   • DIET ORDER     Standing Status:   Standing     Number of Occurrences:   1     Order Specific Question:   Diet:     Answer:   Regular [1]       ACTIVITY  As per PT once [facility] set up  Would suggest no strenuous activity no heavy lifiting    CONSULTATIONS  Dr. Martin, surgery    PROCEDURES  Ct-abdomen-pelvis With    Result Date: 1/3/2018  1/3/2018 6:33 PM HISTORY/REASON FOR EXAM:  RUQ Pain Nausea, vomiting, diarrhea. TECHNIQUE/EXAM DESCRIPTION: CT scan of the abdomen and pelvis with contrast. Contrast-enhanced helical  scanning was obtained from the diaphragmatic domes through the pubic symphysis following the bolus administration of 80 mL of Omnipaque 350 nonionic contrast without complication. Low dose optimization technique was utilized for this CT exam including automated exposure control and adjustment of the mA and/or kV according to patient size. COMPARISON: No prior studies available. FINDINGS: The visualized lung bases are clear. CT Abdomen: The liver and spleen are unremarkable. The pancreas is unremarkable. The gallbladder demonstrates stones. The adrenal glands are not enlarged and the kidneys enhance symmetrically. There is no lymphadenopathy. There is calcified plaque in the aorta. The cecum is located in the left upper quadrant is markedly dilated and fluid-filled. There is inflammatory change with free fluid. No free air identified. The cecum is dilated to approximately 8 cm. There is twisting of the mesentery consistent with volvulus. The main portal vein, splenic vein and superior mesenteric vein are patent. There is a hiatal hernia. There is diverticulosis without evidence of diverticulitis. CT Pelvis: There is pelvic ascites. The appendix is nonvisualized.     1.  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 2.  Diverticulosis without evidence of diverticulitis. 3.  Cholelithiasis. This was discussed with Dr. Sona Mendes at 7:05 PM.    Dx-chest-limited (1 View)    Result Date: 1/3/2018  1/3/2018 4:35 PM HISTORY/REASON FOR EXAM:  Cough Cough and flu TECHNIQUE/EXAM DESCRIPTION AND NUMBER OF VIEWS: Single portable view of the chest. COMPARISON: None FINDINGS: No pulmonary infiltrates or consolidations are noted. No pleural effusion. No pneumothorax. Normal cardiopericardial silhouette.     No acute cardiopulmonary abnormalities are identified.    Please see Dr. Martin's OP NOTE    LABORATORY  Lab Results   Component Value Date/Time    SODIUM 137 01/07/2018 12:42 AM    POTASSIUM 3.2 (L)  01/07/2018 12:42 AM    CHLORIDE 100 01/07/2018 12:42 AM    CO2 28 01/07/2018 12:42 AM    GLUCOSE 98 01/07/2018 12:42 AM    BUN 11 01/07/2018 12:42 AM    CREATININE 0.46 (L) 01/07/2018 12:42 AM        Lab Results   Component Value Date/Time    WBC 7.5 01/07/2018 12:42 AM    HEMOGLOBIN 10.6 (L) 01/07/2018 12:42 AM    HEMATOCRIT 29.8 (L) 01/07/2018 12:42 AM    PLATELETCT 243 01/07/2018 12:42 AM        Total time of the discharge process exceeds 40 minutes

## 2018-01-07 NOTE — DISCHARGE INSTRUCTIONS
Discharge Instructions    Discharged to home by car with relative. Discharged via wheelchair, hospital escort: Yes.  Special equipment needed: Not Applicable    Be sure to schedule a follow-up appointment with your primary care doctor or any specialists as instructed.     Discharge Plan:   Smoking Cessation Offered: Patient Counseled  Pneumococcal Vaccine Given - only chart on this line when given: Given (See MAR)  Influenza Vaccine Indication: Patient Refuses    I understand that a diet low in cholesterol, fat, and sodium is recommended for good health. Unless I have been given specific instructions below for another diet, I accept this instruction as my diet prescription.   Other diet: regular    Special Instructions: None    · Is patient discharged on Warfarin / Coumadin?   No     · Is patient Post Blood Transfusion?  No    Depression / Suicide Risk    As you are discharged from this [facility] facility, it is important to learn how to keep safe from harming yourself.    Recognize the warning signs:  · Abrupt changes in personality, positive or negative- including increase in energy   · Giving away possessions  · Change in eating patterns- significant weight changes-  positive or negative  · Change in sleeping patterns- unable to sleep or sleeping all the time   · Unwillingness or inability to communicate  · Depression  · Unusual sadness, discouragement and loneliness  · Talk of wanting to die  · Neglect of personal appearance   · Rebelliousness- reckless behavior  · Withdrawal from people/activities they love  · Confusion- inability to concentrate     If you or a loved one observes any of these behaviors or has concerns about self-harm, here's what you can do:  · Talk about it- your feelings and reasons for harming yourself  · Remove any means that you might use to hurt yourself (examples: pills, rope, extension cords, firearm)  · Get professional help from the community (Mental Health, Substance Abuse,  psychological counseling)  · Do not be alone:Call your Safe Contact- someone whom you trust who will be there for you.  · Call your local CRISIS HOTLINE 345-4678 or 559-961-5574  · Call your local Children's Mobile Crisis Response Team [place] (232) 824-5948 or www.Inkvite  · Call the toll free National Suicide Prevention Hotlines   · National Suicide Prevention Lifeline 896-266-RNPR (1786)  · [place] [employer] Network 800-SUICIDE (909-4556)    Exploratory Laparotomy, Adult  Exploratory laparotomy is a surgical procedure to examine the organs inside your belly (abdomen). Another name for this is abdominal exploration. You may have this procedure if you have abdominal pain, trauma, bleeding, infection, or obstruction. The procedure may be done if your health care provider cannot make a diagnosis from only an exam and testing.  Exploratory laparotomy may be a planned procedure or an emergency procedure. You may have surgical treatment as part of the laparotomy, or you may have additional treatment after your laparotomy. This will depend on what your surgeon finds during the procedure.  LET YOUR HEALTH CARE PROVIDER KNOW ABOUT:  · Any allergies you have.  · All medicines you are taking, including vitamins, herbs, eye drops, creams, and over-the-counter medicines.  · Previous problems you or members of your family have had with the use of anesthetics.  · Any blood disorders you have.  · Previous surgeries you have had.  · Medical conditions you have.  RISKS AND COMPLICATIONS  Generally, this is a safe procedure. However, problems can occur and include:  · Bleeding.  · Infection.  · A blood clot that forms in your leg and travels to your lungs.  · Damage to organs inside your abdomen.  · Scar tissue that blocks your digestive tract.  BEFORE THE PROCEDURE  · Ask your health care provider about:  ¨ Changing or stopping your regular medicines. This is especially important if you are taking diabetes medicines  or blood thinners.  ¨ Taking medicines such as aspirin and ibuprofen. These medicines can thin your blood. Do not take these medicines before your procedure if your health care provider instructs you not to.  · Do not eat or drink anything after midnight on the night before the procedure or as directed by your health care provider.  · You may be given instructions for clearing out your bowel before surgery (bowel prep). If you are already in the hospital, the bowel prep may be done there.  PROCEDURE  · An IV tube may be inserted into a vein. You may receive fluids and medicine through the IV tube. This may include antibiotic medicine to treat or prevent infection.  · You will be given a medicine that makes you go to sleep (general anesthetic).  · You may have a tube placed through your nose and into your stomach (nasogastric tube) to drain your stomach fluids.  · You may have a tube placed into your bladder (urinary catheter) to drain urine.  · Your abdomen will be cleaned with a germ-killing solution (antiseptic).  · The surgeon will make a surgical cut (incision) in your abdomen. This is usually an up-and-down incision in the midsection of your abdomen. The incision will go through the inside lining of your abdomen (peritoneum).  · Your surgeon will spread the incision wide enough to examine the inside of your abdomen.  · The rest of the procedure will depend on what the surgeon finds:  ¨ The surgeon will check all organs in your abdomen for damage or obstruction. Repairs will be made when possible.  ¨ If there is blood in the abdomen, the surgeon will look for the source of the bleeding in order to stop it.  ¨ If there is yellowish-white fluid (pus) or gastric fluids in your abdomen, the surgeon will check for an infection or a hole (perforation) in your digestive tract.  ¨ If the surgeon finds infection, a drain may be placed to empty fluid that can build up in your abdomen after surgery.  ¨ If there is a growth  (tumor) inside your abdomen, the surgeon may remove a piece of the growth (biopsy) to examine it under a microscope.  · When all procedures are complete, the surgeon will close your abdomen with layers of stitches (sutures).  · The incision through the skin of your abdomen will be closed with sutures or staples.  AFTER THE PROCEDURE  · Your blood pressure, heart rate, breathing rate, and blood oxygen level will be monitored often until the medicines you were given have worn off.  · You will continue to receive fluids and nutrition through your IV tube. This will stop when you can eat and drink on your own.  · You may also get antibiotic medicine and pain medicine through your IV tube.  · Your nasogastric tube may be removed when you start to pass gas.  · Your urinary catheter may be removed when the anesthetic wears off.     This information is not intended to replace advice given to you by your health care provider. Make sure you discuss any questions you have with your health care provider.     Document Released: 09/12/2002 Document Revised: 01/08/2016 Document Reviewed: 08/05/2015  [employer] Interactive Patient Education ©2016 [employer]

## 2018-01-07 NOTE — DISCHARGE PLANNING
Medical SW    Referral: Sw received page from bedside RN to please acquire HH choice.    Intervention: Sw met w/ pt and she does not have a PCP. The one listed on her face sheet (Robin); she reports she has not seen in many years.    Sw explained will request  complete appointment w/ f/u PCP who can write orders for HH. Pt cannot officially acquire HH until she has seen her PCP. Sw completed choice form for Thu HH. Natalee provided to Chas SMITH.     indicates [facility] does not accept pt's [facility] [employer] any longer. She completed an appointment for [facility]. Pt will have to pay out of pocket sliding fee scale based on her income. This appointment to f/u w/ PCP will be listed in AVS & d/c documents.     PCP appointment on AVS:    Jan9 Go to Gely Elizabeth M.D.   Tuesday Jan 9, 2018   Please check in for your appointment at 1pm for your appointment at 130. Please bring photo ID, Proof of income, proof of residence. [facility] has a sliding fee scale. thank you  [address]N [address]   [phone]          Plan: Sw to assist w/ d/c planning as needed.

## 2018-01-08 LAB
BACTERIA BLD CULT: NORMAL
BACTERIA BLD CULT: NORMAL
SIGNIFICANT IND 70042: NORMAL
SIGNIFICANT IND 70042: NORMAL
SITE SITE: NORMAL
SITE SITE: NORMAL
SOURCE SOURCE: NORMAL
SOURCE SOURCE: NORMAL

## 2018-01-11 LAB — EKG IMPRESSION: NORMAL

## 2021-01-14 DIAGNOSIS — Z23 NEED FOR VACCINATION: ICD-10-CM

## 2025-07-31 ENCOUNTER — APPOINTMENT (OUTPATIENT)
Dept: URBAN - METROPOLITAN AREA CLINIC 4 | Facility: CLINIC | Age: 85
Setting detail: DERMATOLOGY
End: 2025-07-31

## 2025-07-31 DIAGNOSIS — D18.0 HEMANGIOMA: ICD-10-CM

## 2025-07-31 DIAGNOSIS — L82.1 OTHER SEBORRHEIC KERATOSIS: ICD-10-CM

## 2025-07-31 DIAGNOSIS — Z71.89 OTHER SPECIFIED COUNSELING: ICD-10-CM

## 2025-07-31 DIAGNOSIS — L81.4 OTHER MELANIN HYPERPIGMENTATION: ICD-10-CM

## 2025-07-31 DIAGNOSIS — L57.0 ACTINIC KERATOSIS: ICD-10-CM

## 2025-07-31 DIAGNOSIS — L72.0 EPIDERMAL CYST: ICD-10-CM

## 2025-07-31 PROBLEM — D18.01 HEMANGIOMA OF SKIN AND SUBCUTANEOUS TISSUE: Status: ACTIVE | Noted: 2025-07-31

## 2025-07-31 PROCEDURE — ? PRESCRIPTION

## 2025-07-31 PROCEDURE — ? COUNSELING

## 2025-07-31 PROCEDURE — ? SUNSCREEN RECOMMENDATIONS

## 2025-07-31 PROCEDURE — ? MEDICATION COUNSELING

## 2025-07-31 RX ORDER — FLUOROURACIL 2 G/40G
CREAM TOPICAL BID
Qty: 40 | Refills: 0 | Status: ERX | COMMUNITY
Start: 2025-07-31

## 2025-07-31 RX ORDER — IMIQUIMOD 12.5 MG/.25G
CREAM TOPICAL QHS
Qty: 12 | Refills: 1 | Status: CANCELLED

## 2025-07-31 RX ADMIN — FLUOROURACIL 1: 2 CREAM TOPICAL at 00:00

## 2025-07-31 ASSESSMENT — LOCATION DETAILED DESCRIPTION DERM
LOCATION DETAILED: RIGHT MID-UPPER BACK
LOCATION DETAILED: RIGHT ANTERIOR DISTAL UPPER ARM
LOCATION DETAILED: RIGHT RADIAL DORSAL HAND
LOCATION DETAILED: LEFT ANTERIOR DISTAL UPPER ARM
LOCATION DETAILED: RIGHT VENTRAL PROXIMAL FOREARM
LOCATION DETAILED: RIGHT PROXIMAL DORSAL FOREARM
LOCATION DETAILED: LEFT ULNAR DORSAL HAND
LOCATION DETAILED: RIGHT DISTAL POSTERIOR THIGH
LOCATION DETAILED: LEFT SUPERIOR UPPER BACK
LOCATION DETAILED: LEFT CENTRAL MALAR CHEEK
LOCATION DETAILED: LEFT ANTERIOR PROXIMAL THIGH
LOCATION DETAILED: LEFT INFERIOR UPPER BACK
LOCATION DETAILED: RIGHT ANTERIOR PROXIMAL THIGH
LOCATION DETAILED: LEFT PROXIMAL DORSAL FOREARM
LOCATION DETAILED: LEFT DISTAL POSTERIOR THIGH
LOCATION DETAILED: RIGHT MEDIAL INFERIOR CHEST

## 2025-07-31 ASSESSMENT — LOCATION SIMPLE DESCRIPTION DERM
LOCATION SIMPLE: RIGHT HAND
LOCATION SIMPLE: RIGHT FOREARM
LOCATION SIMPLE: RIGHT THIGH
LOCATION SIMPLE: LEFT UPPER BACK
LOCATION SIMPLE: LEFT CHEEK
LOCATION SIMPLE: LEFT POSTERIOR THIGH
LOCATION SIMPLE: LEFT FOREARM
LOCATION SIMPLE: RIGHT POSTERIOR THIGH
LOCATION SIMPLE: RIGHT UPPER BACK
LOCATION SIMPLE: LEFT THIGH
LOCATION SIMPLE: CHEST
LOCATION SIMPLE: RIGHT UPPER ARM
LOCATION SIMPLE: LEFT UPPER ARM
LOCATION SIMPLE: LEFT HAND

## 2025-07-31 ASSESSMENT — LOCATION ZONE DERM
LOCATION ZONE: HAND
LOCATION ZONE: LEG
LOCATION ZONE: ARM
LOCATION ZONE: FACE
LOCATION ZONE: TRUNK

## (undated) DEVICE — GLOVE SZ 6.5 BIOGEL PI MICRO - PF LF (50PR/BX)

## (undated) DEVICE — TUBE CONNECT SUCTION CLEAR 120 X 1/4" (50EA/CA)"

## (undated) DEVICE — LIGASURE TISSUE FUSION  - SINGLE USE (6/CA)

## (undated) DEVICE — CANISTER SUCTION 3000ML MECHANICAL FILTER AUTO SHUTOFF MEDI-VAC NONSTERILE LF DISP  (40EA/CA)

## (undated) DEVICE — GLOVE BIOGEL INDICATOR SZ 6.5 SURGICAL PF LTX - (50PR/BX 4BX/CA)

## (undated) DEVICE — KIT ANESTHESIA W/CIRCUIT & 3/LT BAG W/FILTER (20EA/CA)

## (undated) DEVICE — KIT ROOM DECONTAMINATION

## (undated) DEVICE — LACTATED RINGERS INJ 1000 ML - (14EA/CA 60CA/PF)

## (undated) DEVICE — HEAD HOLDER JUNIOR/ADULT

## (undated) DEVICE — STAPLER SKIN DISP - (6/BX 10BX/CA) VISISTAT

## (undated) DEVICE — GLOVE BIOGEL PI INDICATOR SZ 6.5 SURGICAL PF LF - (50/BX 4BX/CA)

## (undated) DEVICE — NEPTUNE 4 PORT MANIFOLD - (20/PK)

## (undated) DEVICE — SUTURE 3-0 VICRYL PLUS SH - 8X 18 INCH (12/BX)

## (undated) DEVICE — TUBE E-T HI-LO CUFF 7.0MM (10EA/PK)

## (undated) DEVICE — SUTURE 0 COATED VICRYL 6-18IN - (12PK/BX)

## (undated) DEVICE — TRAY CATHETER FOLEY URINE METER W/STATLOCK 350ML (10EA/CA)

## (undated) DEVICE — SET LEADWIRE 5 LEAD BEDSIDE DISPOSABLE ECG (1SET OF 5/EA)

## (undated) DEVICE — PROTECTOR ULNA NERVE - (36PR/CA)

## (undated) DEVICE — TOWELS CLOTH SURGICAL - (4/PK 20PK/CA)

## (undated) DEVICE — SUCTION INSTRUMENT YANKAUER BULBOUS TIP W/O VENT (50EA/CA)

## (undated) DEVICE — SUTURE GENERAL

## (undated) DEVICE — BOVIE  BLADE 6 EXTENDED - (50/PK)

## (undated) DEVICE — SODIUM CHL IRRIGATION 0.9% 1000ML (12EA/CA)

## (undated) DEVICE — BLOCK

## (undated) DEVICE — STAPLER 60MM BLUE 3.5MM WITH - STAPLE (3EA/BX)

## (undated) DEVICE — DETERGENT RENUZYME PLUS 10 OZ PACKET (50/BX)

## (undated) DEVICE — PACK MAJOR BASIN - (2EA/CA)

## (undated) DEVICE — GRAFT MESH SEPRAFILM PRO PACK - 5/BX CONTAINS 6 3X5 PIECES

## (undated) DEVICE — MASK ANESTHESIA ADULT  - (100/CA)

## (undated) DEVICE — STAPLER 75MM LINEAR OPEN (3EA/BX)

## (undated) DEVICE — ELECTRODE 850 FOAM ADHESIVE - HYDROGEL RADIOTRNSPRNT (50/PK)

## (undated) DEVICE — DRAPE LAPAROTOMY T SHEET - (12EA/CA)

## (undated) DEVICE — SENSOR SPO2 NEO LNCS ADHESIVE (20/BX) SEE USER NOTES

## (undated) DEVICE — SUTURE 2-0 VICRYL PLUS CT-1 36 (36PK/BX)"

## (undated) DEVICE — GLOVE BIOGEL SZ 6.5 SURGICAL PF LTX (50PR/BX 4BX/CA)

## (undated) DEVICE — STAPLE 75MM LINEAR (12EA/BX)

## (undated) DEVICE — GOWN SURGEONS X-LARGE - DISP. (30/CA)

## (undated) DEVICE — DRESSING LEUKOMED STERILE 11.75X4IN - (50/CA)

## (undated) DEVICE — BAG, SPONGE COUNT 50600

## (undated) DEVICE — ELECTRODE DUAL RETURN W/ CORD - (50/PK)

## (undated) DEVICE — GLOVE BIOGEL SZ 8 SURGICAL PF LTX - (50PR/BX 4BX/CA)

## (undated) DEVICE — STAPLE 55MM LINEAR BLUE 3.8MM (12EA/BX)

## (undated) DEVICE — BLADE SURGICAL CLIPPER - (50EA/CA)

## (undated) DEVICE — SUTURE 1 PDS II PLUS TP-1 - (12PK/BX)